# Patient Record
Sex: MALE | Race: BLACK OR AFRICAN AMERICAN | NOT HISPANIC OR LATINO | Employment: FULL TIME | URBAN - METROPOLITAN AREA
[De-identification: names, ages, dates, MRNs, and addresses within clinical notes are randomized per-mention and may not be internally consistent; named-entity substitution may affect disease eponyms.]

---

## 2017-01-03 ENCOUNTER — CLINICAL SUPPORT (OUTPATIENT)
Dept: REHABILITATION | Facility: HOSPITAL | Age: 62
End: 2017-01-03
Attending: INTERNAL MEDICINE
Payer: COMMERCIAL

## 2017-01-03 DIAGNOSIS — G81.94 LEFT HEMIPARESIS: Primary | ICD-10-CM

## 2017-01-03 PROCEDURE — 97530 THERAPEUTIC ACTIVITIES: CPT | Mod: PO

## 2017-01-03 PROCEDURE — 97110 THERAPEUTIC EXERCISES: CPT | Mod: PO

## 2017-01-03 NOTE — PROGRESS NOTES
Patient:  Alfonso Arias  Clinic #:  [id number]   Date of Note: 01/03/2017   Referring Physician:  Grover Ivey MD  Diagnosis:    1. Left hemiparesis       Visit #32  Start Time: 405  End Time: 455  Total Time: 50 one on one  Group Time: 0    Subjective: Pt. Reported he feels fine. He is using L hand as much as he can but did not exercise as much over the holidays. He states pain with initial movements and that he is slower with most tasks.      Pain: 2 out of 10 L shoulder at rest and 5/10 with first movement of the day in L shoulder which eases with use.     Objective:   Patient seen by OT this session. Treatment  consist of the following: Review of AM movements of swinging arm and shoulder rolls to increase movement.   Ergometer resistance level 5  x 5 minutes forward and 5 minutes reverse. PROM to L shoulder, scapular stabilization exercises L shoulder, Sidelying ER, prone ext, row, and HABD x 30 each.Overhead reach and grasp tasks. Pinch and reach tasks with LUE.      Assessment:Pt will continue to benefit from skilled OT intervention. Patient continues to demonstrate limitation  with  decreased flexibility, decreased range of motion, decreased muscle strength, impaired function and decreased work ability. Pt continues with increased endurance. Tolerated added therex well today with statements that if felt good even though arm tired.    Plan:    Outpatient occupational therapy 2  times weekly for 6 more weeks to include: pt ed, hep, therapeutic exercises, therapeutic activity, ADLs, neuromuscular re-education,       LTG GOALS: Time frame: 8 weeks  Pt will be drink from a cup with L hand with no spills or dropping the cup  Pt. Has trouble releasing cup  Pt will be mod I with feeding  Pt. Still cannot use enough pressure to hold food with fork with LUE.   Pt will increase ROM in L arm for increasing non/verbal expression with L UE to WFL  Improving  Pt will score less than 40% on FOTO for CJ carry  Pt. Is  inconsistent in responses.    STG Goals:  Time frame: 4 weeks    Pt will be drink from a cup with L hand with less than 2 spills  MET  I in HEP.  Pt. Does follow HEP.   Pt will make 100% full fist closure for increase function of L hand  Index lags with grasp. Progressing  Pt. Will be aware of assistive devices to help wash back, crush meds and button. Pt. Has adapted with washing back and one hand buttoning.     PLAN:   Patient/caregiver understands and agrees with plan of care.     Outpatient occupational therapy 2  times weekly for 8 more weeks to include: pt ed, hep, therapeutic exercises, therapeutic activity, ADLs, neuromuscular re-education, joint mobilizations, modalities prn, Extend certification as patient is making functional progress with LUE. Extend to 2/10/16.

## 2017-01-09 ENCOUNTER — INITIAL CONSULT (OUTPATIENT)
Dept: OTOLARYNGOLOGY | Facility: CLINIC | Age: 62
End: 2017-01-09
Payer: COMMERCIAL

## 2017-01-09 ENCOUNTER — CLINICAL SUPPORT (OUTPATIENT)
Dept: AUDIOLOGY | Facility: CLINIC | Age: 62
End: 2017-01-09
Payer: COMMERCIAL

## 2017-01-09 VITALS
WEIGHT: 211.88 LBS | HEART RATE: 52 BPM | BODY MASS INDEX: 28.08 KG/M2 | HEIGHT: 73 IN | TEMPERATURE: 97 F | SYSTOLIC BLOOD PRESSURE: 110 MMHG | DIASTOLIC BLOOD PRESSURE: 65 MMHG

## 2017-01-09 DIAGNOSIS — H90.3 SENSORINEURAL HEARING LOSS OF BOTH EARS: Primary | ICD-10-CM

## 2017-01-09 DIAGNOSIS — Z01.10 EVALUATION OF HEARING IMPAIRMENT: ICD-10-CM

## 2017-01-09 DIAGNOSIS — H61.22 IMPACTED CERUMEN, LEFT EAR: Primary | ICD-10-CM

## 2017-01-09 DIAGNOSIS — Z86.73 HISTORY OF CVA (CEREBROVASCULAR ACCIDENT): ICD-10-CM

## 2017-01-09 PROCEDURE — 69210 REMOVE IMPACTED EAR WAX UNI: CPT | Mod: S$GLB,,, | Performed by: OTOLARYNGOLOGY

## 2017-01-09 PROCEDURE — 92567 TYMPANOMETRY: CPT | Mod: 51,S$GLB,, | Performed by: AUDIOLOGIST

## 2017-01-09 PROCEDURE — 99213 OFFICE O/P EST LOW 20 MIN: CPT | Mod: 25,S$GLB,, | Performed by: OTOLARYNGOLOGY

## 2017-01-09 PROCEDURE — 1159F MED LIST DOCD IN RCRD: CPT | Mod: S$GLB,,, | Performed by: OTOLARYNGOLOGY

## 2017-01-09 PROCEDURE — 92557 COMPREHENSIVE HEARING TEST: CPT | Mod: S$GLB,,, | Performed by: AUDIOLOGIST

## 2017-01-09 PROCEDURE — 99999 PR PBB SHADOW E&M-EST. PATIENT-LVL III: CPT | Mod: PBBFAC,,, | Performed by: OTOLARYNGOLOGY

## 2017-01-09 PROCEDURE — 3074F SYST BP LT 130 MM HG: CPT | Mod: S$GLB,,, | Performed by: OTOLARYNGOLOGY

## 2017-01-09 PROCEDURE — 99999 PR PBB SHADOW E&M-EST. PATIENT-LVL I: CPT | Mod: PBBFAC,,,

## 2017-01-09 PROCEDURE — 3078F DIAST BP <80 MM HG: CPT | Mod: S$GLB,,, | Performed by: OTOLARYNGOLOGY

## 2017-01-09 RX ORDER — FERROUS GLUCONATE 324(37.5)
1 TABLET ORAL DAILY
Refills: 5 | COMMUNITY
Start: 2017-01-03 | End: 2017-01-09

## 2017-01-09 NOTE — MR AVS SNAPSHOT
[facility] - Otorhinolaryngology  [address]  [facility] [address]  Phone: [phone]  Fax: [phone]                  Alfonso Arias   2017 2:45 PM   Initial consult    Description:  Male : 1955   Provider:  Isaias Hargrove III, MD   Department:  [facility] - Otorhinolaryngology           Diagnoses this Visit        Comments    Impacted cerumen, left ear    -  Primary     History of CVA (cerebrovascular accident)         Evaluation of hearing impairment                To Do List           Future Appointments        Provider Department Dept Phone    2017 8:15 AM Vilma Lew OT [facility] [phone]    2017 7:30 AM LAB, [facility] - Laboratory [phone]    3/9/2017 3:20 PM MD [facility] [place] - Internal Medicine [phone]      Goals (5 Years of Data)     None      [facility]     [facility] On [facility] Nurse Care Line -  Assistance  Registered nurses in the [facility] provide clinical advisement, health education, appointment booking, and other advisory services.  Call for this free service at [phone].             Medications           Message regarding Medications     Verify the changes and/or additions to your medication regime listed below are the same as discussed with your clinician today.  If any of these changes or additions are incorrect, please notify your healthcare provider.             Verify that the below list of medications is an accurate representation of the medications you are currently taking.  If none reported, the list may be blank. If incorrect, please contact your healthcare provider. Carry this list with you in case of emergency.           Current Medications     amlodipine (NORVASC) 2.5 MG tablet TAKE ONE TABLET BY MOUTH ONE TIME DAILY    aspirin (ECOTRIN) 81 MG EC tablet Take 1 tablet (81 mg total) by mouth once daily.    atorvastatin (LIPITOR) 40 MG tablet TAKE ONE TABLET BY  "MOUTH ONE TIME DAILY     ferrous gluconate (FERGON) 325 MG Tab Take 1 tablet (325 mg total) by mouth daily with breakfast.    metoprolol tartrate (LOPRESSOR) 25 MG tablet Take 1 tablet (25 mg total) by mouth 2 (two) times daily.    pantoprazole (PROTONIX) 40 MG tablet TAKE ONE TABLET BY MOUTH ONE TIME DAILY           Clinical Reference Information           Vital Signs - Last Recorded  Most recent update: 1/9/2017  2:06 PM by Shakir Contreras MA    BP Pulse Temp    110/65 (BP Location: Left arm, Patient Position: Sitting, BP Method: Automatic) (!) 52 97.2 °F (36.2 °C) (Tympanic)    Ht Wt BMI    6' 1" (1.854 m) 96.1 kg (211 lb 13.8 oz) 27.95 kg/m2      Blood Pressure          Most Recent Value    BP  110/65      Allergies as of 1/9/2017     No Known Allergies      Immunizations Administered on Date of Encounter - 1/9/2017     None      Instructions    Cerumen impaction removed from deep AS eac with suction/microforceps  Audiometry reviewed: multi-frequency SNHL  Pt. Is a candidate for hearing amplification for one or both ears   copy of audiogram/KEARA Berry's card/Rx to obtain hearing aid (S) provided  Monitor jhearing yearly; ear cleaning prn            "

## 2017-01-09 NOTE — LETTER
January 9, 2017      Grover Ivey MD  [address]  [place] [address]           [facility] - Otorhinolaryngology  [address]  [facility] [address]  Phone: [phone]  Fax: [phone]          Patient: Alfonso Arias   MR Number: [id number]   YOB: 1955   Date of Visit: 1/9/2017       Dear Dr. Grover Ivey:    Thank you for referring Alfonso Arias to me for evaluation. Attached you will find relevant portions of my assessment and plan of care.    If you have questions, please do not hesitate to call me. I look forward to following Alfonso Arias along with you.    Sincerely,    Isaias Hargrove III, MD    Enclosure  CC:  No Recipients    If you would like to receive this communication electronically, please contact [email or web address] or [phone] to request more information on [facility] access.    For providers and/or their staff who would like to refer a patient to [facility], please contact us through our one-stop-shop provider referral line, [facility], at [phone].    If you feel you have received this communication in error or would no longer like to receive these types of communications, please e-mail [email or web address]

## 2017-01-09 NOTE — PATIENT INSTRUCTIONS
Cerumen impaction removed from deep AS eac with suction/microforceps  Audiometry reviewed: multi-frequency SNHL  Pt. Is a candidate for hearing amplification for one or both ears   copy of audiogram/KEARA Berry's card/Rx to obtain hearing aid (S) provided  Monitor jhearing yearly; ear cleaning prn

## 2017-01-10 NOTE — PROGRESS NOTES
Subjective:       Patient ID: Alfonso Arias is a 61 y.o. male.    Chief Complaint: No chief complaint on file.    HPI: Mr. Arias is a 61-year-old slightly dysarthric -American male with status post a CVA ( cerebral thrombosis with cerebral infarction) which occurred in February 2015 affecting the left side of his body.  Head scanning of 2015 indicated a small infarct of the right posterior insular extending into the centrum semiovale.  He ndicates the need for an ear cleaning procedure in his left ear.  He is here for hearing assessment as well.  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  He try going back to work after his stroke was unable to travel and in doing with her work load; he is now retired.  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  He knew Merlin Olson.    PMH: High blood pressure, high cholesterol, stroke, arthritis, hearing loss, unspecified cancer  PSH:  Family history: Unspecified cancer, hearing loss, arthritis, stroke, mental illness, alcoholism, asthma, high cholesterol, high blood pressure  Review of Systems   Ears: Positive for hearing loss, ear pain, ear pressure, ringing in ear, dizziness, head trauma and family history of hearing loss.    Nose:  Positive for nasal obstruction, nasal or sinus surgery, loss of smell, postnasal drip and snoring.    Mouth/Throat: Positive for pain swallowing, impaired swallowing and hoarse voice.   Constitutional: Positive for recent unexplained weight loss, fever, chills and night sweats.    Eyes:  Positive for visual change.   Cardiovascular:  Positive for history of high blood pressure.   Respiratory:  Positive for recent cough.    Gastrointestinal:  Positive for history of stomach ulcers or pain, acid reflux, indigestion and blood in stool.   Other:  Positive for kidney problem, bladder problem, arthritis, weakness,  confusion, depression, anxiety, heat intolerance, cold intolerance and swollen glands. Negative for rash.        Tthe patient completed an audiometric study performed by the [facility] audiology service after his left ear canal was cleaned.  The results of duplicated below and reviewed with the patient in detail.  Objective:           Blood pressure 110/65 pulse 52 temperature 97.2 height 6 feet 1 inch weight 211 pounds  Gen.: Alert and oriented and but slightly dysarthric gentleman in no acute distress.  Physical Exam   Constitutional: He is oriented to person, place, and time. He appears well-developed and well-nourished.   HENT:   Head: Normocephalic.   Right Ear: Hearing, tympanic membrane and ear canal normal. No drainage. No foreign bodies. No mastoid tenderness. Tympanic membrane is not perforated. No decreased hearing is noted.   Left Ear: Hearing, tympanic membrane and ear canal normal. No drainage. No foreign bodies. No mastoid tenderness. Tympanic membrane is not perforated. No decreased hearing is noted.   Ears:    Nose: No nose lacerations, nasal deformity, septal deviation or nasal septal hematoma. No epistaxis. Right sinus exhibits no maxillary sinus tenderness and no frontal sinus tenderness. Left sinus exhibits no maxillary sinus tenderness and no frontal sinus tenderness.   Mouth/Throat: Uvula is midline, oropharynx is clear and moist and mucous membranes are normal. He does not have dentures. No oral lesions. No trismus in the jaw. No uvula swelling or dental caries. No oropharyngeal exudate or tonsillar abscesses.   Neck: No thyromegaly present.   Pulmonary/Chest: Effort normal. No stridor.   Lymphadenopathy:     He has no cervical adenopathy.   Neurological: He is alert and oriented to person, place, and time. He displays weakness.   Skin: No rash noted.   Psychiatric: His behavior is normal.       Assessment:       1. Impacted cerumen, left ear    2. History of CVA  (cerebrovascular accident)    3. Evaluation of hearing impairment        Plan:     Cerumen impaction removed from deep AS eac with suction/microforceps  Audiometry reviewed: multi-frequency SNHL  Pt. Is a candidate for hearing amplification for one or both ears   copy of audiogram/KEARA Berry's card/Rx to obtain hearing aid (S) provided  Monitor jhearing yearly; ear cleaning prn

## 2017-01-13 ENCOUNTER — CLINICAL SUPPORT (OUTPATIENT)
Dept: REHABILITATION | Facility: HOSPITAL | Age: 62
End: 2017-01-13
Attending: INTERNAL MEDICINE
Payer: COMMERCIAL

## 2017-01-13 DIAGNOSIS — G81.94 LEFT HEMIPARESIS: Primary | ICD-10-CM

## 2017-01-13 PROCEDURE — 97110 THERAPEUTIC EXERCISES: CPT | Mod: PO

## 2017-01-13 PROCEDURE — 97530 THERAPEUTIC ACTIVITIES: CPT | Mod: PO

## 2017-01-13 NOTE — PROGRESS NOTES
Patient:  Alfonso Arias  Clinic #:  [id number]   Date of Note: 01/13/2017   Referring Physician:  Grover Ivey MD  Diagnosis:    1. Left hemiparesis       Visit #33  Start Time: 815  End Time: 905  Total Time: 50 one on one  Group Time: 0    Subjective: Pt. Reported he feels fine. He is using L hand as much as he can stating exercise is his job now.    Pain: 0 out of 10 L shoulder at rest with movement  in L shoulder.     Objective:   Patient seen by OT this session. Treatment  consist of the following:  Ergometer resistance level 5  x 5 minutes forward and 5 minutes reverse. PROM to L shoulder, scapular stabilization exercises L shoulder, Sidelying ER, prone ext, row, and HABD x 30 each Pinch and manipulation tasks with LUE with nuts/bolts and card tasks.      Assessment:Pt will continue to benefit from skilled OT intervention. Patient continues to demonstrate limitation  with  decreased flexibility, decreased range of motion, decreased muscle strength, impaired function and decreased work ability. Pt continues with increased endurance. Tolerated added therex well today with statements that if felt good even though arm tired.    Plan:    Outpatient occupational therapy 2  times weekly for 6 more weeks to include: pt ed, hep, therapeutic exercises, therapeutic activity, ADLs, neuromuscular re-education,       LTG GOALS: Time frame: 8 weeks  Pt will be drink from a cup with L hand with no spills or dropping the cup  Pt. Has trouble releasing cup  Pt will be mod I with feeding  Pt. Still cannot use enough pressure to hold food with fork with LUE.   Pt will increase ROM in L arm for increasing non/verbal expression with L UE to WFL  Improving  Pt will score less than 40% on FOTO for CJ carry  Pt. Is inconsistent in responses.    STG Goals:  Time frame: 4 weeks    Pt will be drink from a cup with L hand with less than 2 spills  MET  I in HEP.  Pt. Does follow HEP.   Pt will make 100% full fist closure for increase  function of L hand  Index lags with grasp. Progressing  Pt. Will be aware of assistive devices to help wash back, crush meds and button. Pt. Has adapted with washing back and one hand buttoning.     PLAN:   Patient/caregiver understands and agrees with plan of care.     Outpatient occupational therapy 2  times weekly for 8 more weeks to include: pt ed, hep, therapeutic exercises, therapeutic activity, ADLs, neuromuscular re-education, joint mobilizations, modalities prn, Extend certification as patient is making functional progress with LUE. Extend to 2/10/16.

## 2017-01-17 ENCOUNTER — TELEPHONE (OUTPATIENT)
Dept: INTERNAL MEDICINE | Facility: CLINIC | Age: 62
End: 2017-01-17

## 2017-01-17 NOTE — TELEPHONE ENCOUNTER
----- Message from Carina Nugent sent at 1/17/2017 11:28 AM CST -----  Contact: [phone] socrates   Calling to get copy of latest notes. Sent request on 1/9 via fax and never heard anything back.

## 2017-01-19 ENCOUNTER — CLINICAL SUPPORT (OUTPATIENT)
Dept: REHABILITATION | Facility: HOSPITAL | Age: 62
End: 2017-01-19
Attending: INTERNAL MEDICINE
Payer: COMMERCIAL

## 2017-01-19 DIAGNOSIS — G81.94 LEFT HEMIPARESIS: Primary | ICD-10-CM

## 2017-01-19 PROCEDURE — 97140 MANUAL THERAPY 1/> REGIONS: CPT | Mod: PO

## 2017-01-19 PROCEDURE — 97110 THERAPEUTIC EXERCISES: CPT | Mod: PO

## 2017-01-19 NOTE — PROGRESS NOTES
Patient:  Alfonso Arias  Clinic #:  [id number]   Date of Note: 01/19/2017   Referring Physician:  Grover Ivey MD  Diagnosis:    1. Left hemiparesis       Visit #34  Start Time: 11  End Time: 1155  Total Time: 50 one on one  Group Time: 0    Subjective: Pt. Reported he feels fine. He is using L hand as much as he can he reports his shoulder is still feeling a little stiff.   Pain: 0 out of 10 L shoulder at rest with movement  in L shoulder.     Objective:   Patient seen by OT this session. Treatment  consist of the following:  Ergometer resistance level 5  x 5 minutes forward and 5 minutes reverse. PROM to L shoulder, scapular stabilization exercises L shoulder, Sidelying ER, prone ext, row, and HABD x 30 each Pinch and manipulation tasks using small velcro blocks x 10 min.       Assessment:Pt will continue to benefit from skilled OT intervention. Improved mobility and laxity following manual techniques today. Pt reported feeling more loose than when he came in.  Patient continues to demonstrate limitation  with  decreased flexibility, decreased range of motion, decreased muscle strength, impaired function and decreased work ability. Pt continues with increased endurance. Tolerated added therex well today with statements that if felt good even though arm tired.    Plan:    Outpatient occupational therapy 2  times weekly for 6 more weeks to include: pt ed, hep, therapeutic exercises, therapeutic activity, ADLs, neuromuscular re-education,       LTG GOALS: Time frame: 8 weeks  Pt will be drink from a cup with L hand with no spills or dropping the cup  Pt. Has trouble releasing cup  Pt will be mod I with feeding  Pt. Still cannot use enough pressure to hold food with fork with LUE.   Pt will increase ROM in L arm for increasing non/verbal expression with L UE to WFL  Improving  Pt will score less than 40% on FOTO for CJ carry  Pt. Is inconsistent in responses.    STG Goals:  Time frame: 4 weeks    Pt will be drink  from a cup with L hand with less than 2 spills  MET  I in HEP.  Pt. Does follow HEP.   Pt will make 100% full fist closure for increase function of L hand  Index lags with grasp. Progressing  Pt. Will be aware of assistive devices to help wash back, crush meds and button. Pt. Has adapted with washing back and one hand buttoning.     PLAN:   Patient/caregiver understands and agrees with plan of care.     Outpatient occupational therapy 2  times weekly for 8 more weeks to include: pt ed, hep, therapeutic exercises, therapeutic activity, ADLs, neuromuscular re-education, joint mobilizations, modalities prn, Extend certification as patient is making functional progress with LUE. Extend to 2/10/16.

## 2017-01-30 ENCOUNTER — TELEPHONE (OUTPATIENT)
Dept: INTERNAL MEDICINE | Facility: CLINIC | Age: 62
End: 2017-01-30

## 2017-01-30 NOTE — TELEPHONE ENCOUNTER
----- Message from Isaías Jin sent at 1/30/2017  2:07 PM CST -----  Contact: Pt at [phone]  Patient would like to get medical advice.  Symptoms (please be specific):  Sore throat, mouth pain  How long has patient had these symptoms:  About 3 days  Pharmacy name and phone #:  [facility]  [phone] (Phone)  [phone] (Fax)  Any drug allergies:  none    Comments: Pt requesting a call back before medication is called in to pharmacy.

## 2017-01-31 ENCOUNTER — OFFICE VISIT (OUTPATIENT)
Dept: INTERNAL MEDICINE | Facility: CLINIC | Age: 62
End: 2017-01-31
Payer: COMMERCIAL

## 2017-01-31 VITALS
HEART RATE: 68 BPM | HEIGHT: 73 IN | BODY MASS INDEX: 27.49 KG/M2 | TEMPERATURE: 98 F | WEIGHT: 207.44 LBS | DIASTOLIC BLOOD PRESSURE: 70 MMHG | SYSTOLIC BLOOD PRESSURE: 110 MMHG

## 2017-01-31 DIAGNOSIS — Z85.810 HISTORY OF TONGUE CANCER: ICD-10-CM

## 2017-01-31 DIAGNOSIS — J02.9 PHARYNGITIS, UNSPECIFIED ETIOLOGY: Primary | ICD-10-CM

## 2017-01-31 PROCEDURE — 99213 OFFICE O/P EST LOW 20 MIN: CPT | Mod: S$GLB,,, | Performed by: FAMILY MEDICINE

## 2017-01-31 PROCEDURE — 99999 PR PBB SHADOW E&M-EST. PATIENT-LVL III: CPT | Mod: PBBFAC,,, | Performed by: FAMILY MEDICINE

## 2017-01-31 PROCEDURE — 1159F MED LIST DOCD IN RCRD: CPT | Mod: S$GLB,,, | Performed by: FAMILY MEDICINE

## 2017-01-31 PROCEDURE — 3078F DIAST BP <80 MM HG: CPT | Mod: S$GLB,,, | Performed by: FAMILY MEDICINE

## 2017-01-31 PROCEDURE — 3074F SYST BP LT 130 MM HG: CPT | Mod: S$GLB,,, | Performed by: FAMILY MEDICINE

## 2017-01-31 RX ORDER — AMOXICILLIN 500 MG/1
500 TABLET, FILM COATED ORAL EVERY 12 HOURS
Qty: 14 TABLET | Refills: 0 | Status: SHIPPED | OUTPATIENT
Start: 2017-01-31 | End: 2017-02-10

## 2017-01-31 RX ORDER — HYDROCODONE BITARTRATE AND ACETAMINOPHEN 5; 325 MG/1; MG/1
1 TABLET ORAL EVERY 6 HOURS PRN
Qty: 30 TABLET | Refills: 0 | Status: SHIPPED | OUTPATIENT
Start: 2017-01-31 | End: 2017-07-21 | Stop reason: SDUPTHER

## 2017-01-31 NOTE — MR AVS SNAPSHOT
[facility] Internal Medicine   [facility] [address]  Phone: [phone]  Fax: [phone]                  Alfonso Arias   2017 9:40 AM   Office Visit    Description:  Male : 1955   Provider:  Charly Stevens MD   Department:  [facility] Internal Medicine           Reason for Visit     Otalgia     Sore Throat     Oral Pain           Diagnoses this Visit        Comments    Pharyngitis, unspecified etiology    -  Primary            To Do List           Future Appointments        Provider Department Dept Phone    2/3/2017 11:00 AM Renate Engle, OT [facility] [phone]    2017 7:30 AM LAB, [facility] - Laboratory [phone]    3/9/2017 3:20 PM Grover Ivey MD [facility] Internal Medicine [phone]      Goals (5 Years of Data)     None       These Medications        Disp Refills Start End    amoxicillin (AMOXIL) 500 MG Tab 14 tablet 0 2017 2/10/2017    Take 1 tablet (500 mg total) by mouth every 12 (twelve) hours. - Oral    Pharmacy: [facility] [id number] [place] [address] Ph #: [phone]       hydrocodone-acetaminophen 5-325mg (NORCO) 5-325 mg per tablet 30 tablet 0 2017     Take 1 tablet by mouth every 6 (six) hours as needed for Pain (severe pain). - Oral    Pharmacy: [facility] [id number] [place] [address] Ph #: [phone]         [facility]     [facility] -  Assistance  Registered nurses in the [facility] provide clinical advisement, health education, appointment booking, and other advisory services.  Call for this free service at [phone].             Medications           Message regarding Medications     Verify the changes and/or additions to your medication regime listed below are the same as discussed with your clinician today.  If any of these changes or additions are incorrect, please notify  "your healthcare provider.        START taking these NEW medications        Refills    amoxicillin (AMOXIL) 500 MG Tab 0    Sig: Take 1 tablet (500 mg total) by mouth every 12 (twelve) hours.    Class: Normal    Route: Oral    hydrocodone-acetaminophen 5-325mg (NORCO) 5-325 mg per tablet 0    Sig: Take 1 tablet by mouth every 6 (six) hours as needed for Pain (severe pain).    Class: Normal    Route: Oral           Verify that the below list of medications is an accurate representation of the medications you are currently taking.  If none reported, the list may be blank. If incorrect, please contact your healthcare provider. Carry this list with you in case of emergency.           Current Medications     amlodipine (NORVASC) 2.5 MG tablet TAKE ONE TABLET BY MOUTH ONE TIME DAILY    amoxicillin (AMOXIL) 500 MG Tab Take 1 tablet (500 mg total) by mouth every 12 (twelve) hours.    aspirin (ECOTRIN) 81 MG EC tablet Take 1 tablet (81 mg total) by mouth once daily.    atorvastatin (LIPITOR) 40 MG tablet TAKE ONE TABLET BY MOUTH ONE TIME DAILY     ferrous gluconate (FERGON) 325 MG Tab Take 1 tablet (325 mg total) by mouth daily with breakfast.    hydrocodone-acetaminophen 5-325mg (NORCO) 5-325 mg per tablet Take 1 tablet by mouth every 6 (six) hours as needed for Pain (severe pain).    metoprolol tartrate (LOPRESSOR) 25 MG tablet Take 1 tablet (25 mg total) by mouth 2 (two) times daily.    pantoprazole (PROTONIX) 40 MG tablet TAKE ONE TABLET BY MOUTH ONE TIME DAILY           Clinical Reference Information           Vital Signs - Last Recorded  Most recent update: 1/31/2017  9:53 AM by Rhina Betancur MA    BP Pulse Temp Ht Wt BMI    110/70 (BP Location: Right arm, Patient Position: Sitting, BP Method: Manual) 68 98.3 °F (36.8 °C) (Oral) 6' 1" (1.854 m) 94.1 kg (207 lb 7.3 oz) 27.37 kg/m2      Blood Pressure          Most Recent Value    BP  110/70      Allergies as of 1/31/2017     No Known Allergies      Immunizations " Administered on Date of Encounter - 1/31/2017     None      Instructions      When You Have a Sore Throat  A sore throat can be painful. There are many reasons why you may have a sore throat. Your healthcare provider will work with you to find the cause of your sore throat. He or she will also find the best treatment for you.      What Causes a Sore Throat?  Sore throats can be caused or worsened by:  · Cold or flu viruses  · Bacteria  · Irritants such as tobacco smoke  · Acid reflux  A Healthy Throat  The tonsils are on the sides of the throat near the base of the tongue. They collect viruses and bacteria and help fight infection. The throat (pharynx) is the passage for air. Mucus from the nasal cavity also moves down the passage.  An Inflamed Throat  The tonsils and pharynx can become inflamed due to a cold or flu virus. Postnasal drip (excess mucus draining from the nasal cavity) can irritate the throat. It can also make the throat or tonsils more likely to be infected by bacteria. Severe, untreated tonsillitis in children or adults can cause a pocket of pus (abscess) to form near the tonsil.  Your Evaluation  A medical evaluation can help find the cause of your sore throat. It can also help your healthcare provider choose the best treatment for you. The evaluation may include a health history, physical exam, and diagnostic tests.  Health History  Your healthcare provider may ask you the following:  · How long has the sore throat lasted and how have you been treating it?  · Do you have any other symptoms, such as body aches, fever, or cough?  · Does your sore throat recur? If so, how often? How many days of school or work have you missed because of a sore throat?  · Do you have trouble eating or swallowing?  · Have you been told that you snore or have other sleep problems?  · Do you have bad breath?  · Do you cough up bad-tasting mucus?  Physical Exam  During the exam, your healthcare provider checks your ears,  nose, and throat for problems. He or she also checks for swelling in the neck, and may listen to your chest.  Possible Tests  Other tests your healthcare provider may perform include:  · A throat swab to check for bacteria such as streptococcus (the bacteria that causes strep throat)  · A blood test to check for mononucleosis (a viral infection)  · A chest x-ray to rule out pneumonia, especially if you have a cough  Treating a Sore Throat  Treatment depends on many factors. What is the likely cause? Is the problem recent? Does it keep coming back? In many cases, the best thing to do is to treat the symptoms, rest, and let the problem heal itself. Antibiotics may help clear up some infections. For cases of severe or recurring tonsillitis, the tonsils may need to be removed.  Relieving Your Symptoms  · Dont smoke, and avoid secondhand smoke.  · For children, try throat sprays or Popsicles. Adults and older children may try lozenges.  · Drink warm liquids to soothe the throat and help thin mucus. Avoid alcohol, spicy foods, and acidic drinks such as orange juice. These can irritate the throat.  · Gargle with warm saltwater (1 teaspoon of salt to 8 ounces of warm water).  · Use a humidifier to keep air moist and relieve throat dryness.  · Try over-the-counter pain relievers such as acetaminophen or ibuprofen. Use as directed, and dont exceed the recommended dose. Dont give aspirin to children.   Are Antibiotics Needed?  If your sore throat is due to a bacterial infection, antibiotics may speed healing and prevent complications. But most sore throats are caused by cold or flu viruses. And antibiotics dont treat viral illness. In fact, using antibiotics when theyre not needed may produce bacteria that are harder to kill. Your healthcare provider will prescribe antibiotics only if he or she thinks they are likely to help.  If Antibiotics Are Prescribed  Take the medication exactly as directed. Be sure to finish your  prescription even if youre feeling better.  And be sure to ask your healthcare provider or pharmacist what side effects are common and what to do about them.  Is Surgery Needed?  In some cases, tonsils need to be removed. This is often done as outpatient (same-day) surgery. Your healthcare provider may advise removing the tonsils in cases of:  · Several severe bouts of tonsillitis in a year. Severe episodes include those that lead to missed days of school or work, or that need to be treated with antibiotics.  · Tonsillitis that causes breathing problems during sleep.  · Tonsillitis caused by food particles collecting in pouches in the tonsils (cryptic tonsillitis).  Call your healthcare provider if any of the following occur:  · Symptoms worsen, or new symptoms develop.  · Swollen tonsils make breathing difficult.  · The pain is severe enough to keep you from drinking liquids.  · A skin rash, hives, or wheezing develops. Any of these could signal an allergic reaction to antibiotics.  · Symptoms dont improve within a week.  · Symptoms dont improve within 2-3 days of starting antibiotics.   © 2992-2504 [employer]. [address] [place] [address]. All rights reserved. This information is not intended as a substitute for professional medical care. Always follow your healthcare professional's instructions.        Self-Care for Sore Throats  Sore throats occur for many reasons, such as colds, allergies, and infections caused by viruses or bacteria. In any case, your throat becomes red and sore. Your goal for self-care is to reduce your discomfort while giving your throat a chance to heal.    Moisten and Soothe Your Throat  · Try a sip of water first thing after waking up.  · Keep your throat moist by drinking 6 or more glasses of clear liquids every day.  · Run a cool-air humidifier in your room overnight.  · Avoid cigarette smoke.   · Suck on throat lozenges, cough drops, hard candy, ice  chips, or frozen fruit-juice bars. Use the sugar-free versions if your diet or medical condition require them.  Gargle to Ease Irritation  Gargling every hour or 2 can ease irritation. Try gargling with 1 of these solutions:  · 1/4 teaspoon of salt in 1/2 cup of warm water  · An over-the-counter anesthetic gargle  Use Medication for More Relief  Over-the-counter medication can reduce sore throat symptoms. Ask your pharmacist if you have questions about which medication to use:  · Ease pain with anesthetic sprays. Aspirin or an aspirin substitute also helps. Remember, never give aspirin to anyone 18 or younger, or if you are already taking blood thinners.   · For sore throats caused by allergies, try antihistamines to block the allergic reaction.  · Remember: unless a sore throat is caused by a bacterial infection, antibiotics wont help you.  Prevent Future Sore Throats  · Stop smoking or reduce contact with secondhand smoke. Smoke irritates the tender throat lining.  · Limit contact with pets and with allergy-causing substances, such as pollen and mold.  · When youre around someone with a sore throat or cold, wash your hands frequently to keep viruses or bacteria from spreading.  · Dont strain your vocal cords.  Call Your Health Care Provider  Contact your doctor if you have:  · A temperature over 101°F (38.3°C)  · White spots on the throat  · Great difficulty swallowing  · Trouble breathing  · A skin rash  · Recent exposure to someone else with strep bacteria  · Severe hoarseness and swollen glands in the neck or jaw   © 2521-0747 [employer]. [address] [place] [address], [place] [address]. All rights reserved. This information is not intended as a substitute for professional medical care. Always follow your healthcare professional's instructions.

## 2017-01-31 NOTE — PROGRESS NOTES
Subjective:   Patient ID: Alfonso Arias is a 62 y.o. male.    Chief Complaint: Otalgia; Sore Throat; and Oral Pain      HPI  Cordial 61 yo male here for mouth pain and RIGHT ear pain and sore throat. Going on for a few days. Some mild rhinorrhea. Some sneezing. No significant cough. No fevers or chills. Nothing makes it better. Swallowing makes pain worse. Significant history of tongue cancer and G-tube placement, etc. No other complaints inc no recent dysphagia or odynophagia other than that chronic except for past two days and aw URI symptoms.  Patient queried and denies any further complaints.      ALLERGIES AND MEDICATIONS: updated and reviewed.  Review of patient's allergies indicates:  No Known Allergies    Current Outpatient Prescriptions:     amlodipine (NORVASC) 2.5 MG tablet, TAKE ONE TABLET BY MOUTH ONE TIME DAILY, Disp: 30 tablet, Rfl: 10    amoxicillin (AMOXIL) 500 MG Tab, Take 1 tablet (500 mg total) by mouth every 12 (twelve) hours., Disp: 14 tablet, Rfl: 0    aspirin (ECOTRIN) 81 MG EC tablet, Take 1 tablet (81 mg total) by mouth once daily., Disp: 30 tablet, Rfl: 0    atorvastatin (LIPITOR) 40 MG tablet, TAKE ONE TABLET BY MOUTH ONE TIME DAILY , Disp: 30 tablet, Rfl: 10    ferrous gluconate (FERGON) 325 MG Tab, Take 1 tablet (325 mg total) by mouth daily with breakfast., Disp: 30 tablet, Rfl: 5    hydrocodone-acetaminophen 5-325mg (NORCO) 5-325 mg per tablet, Take 1 tablet by mouth every 6 (six) hours as needed for Pain (severe pain)., Disp: 30 tablet, Rfl: 0    metoprolol tartrate (LOPRESSOR) 25 MG tablet, Take 1 tablet (25 mg total) by mouth 2 (two) times daily., Disp: 60 tablet, Rfl: 11    pantoprazole (PROTONIX) 40 MG tablet, TAKE ONE TABLET BY MOUTH ONE TIME DAILY, Disp: 30 tablet, Rfl: 4    Review of Systems   Constitutional: Negative for activity change, chills, fatigue, fever and unexpected weight change.   HENT: Positive for ear pain, postnasal drip, rhinorrhea, sinus pressure and  "tinnitus. Negative for facial swelling and nosebleeds.    Eyes: Negative for pain and redness.   Respiratory: Negative for cough and shortness of breath.    Gastrointestinal: Negative for abdominal pain, diarrhea, nausea and vomiting.   Musculoskeletal: Negative for joint swelling and neck pain.   Neurological: Negative for tremors and weakness.       Objective:     Vitals:    01/31/17 0950   BP: 110/70   Pulse: 68   Temp: 98.3 °F (36.8 °C)   TempSrc: Oral   Weight: 94.1 kg (207 lb 7.3 oz)   Height: 6' 1" (1.854 m)   PainSc:   7     Body mass index is 27.37 kg/(m^2).    Physical Exam   Constitutional: He appears well-developed and well-nourished.   HENT:   Head: Normocephalic and atraumatic.   Right Ear: External ear normal.   Left Ear: External ear normal.   Mouth/Throat: Posterior oropharyngeal erythema present. No oropharyngeal exudate or posterior oropharyngeal edema.   Cardiovascular: Normal rate and regular rhythm.    Pulmonary/Chest: Effort normal and breath sounds normal.   Nursing note and vitals reviewed.      Assessment and Plan:   Alfonso was seen today for otalgia, sore throat and oral pain.    Diagnoses and all orders for this visit:    Pharyngitis, unspecified etiology  -     amoxicillin (AMOXIL) 500 MG Tab; Take 1 tablet (500 mg total) by mouth every 12 (twelve) hours.  -     hydrocodone-acetaminophen 5-325mg (NORCO) 5-325 mg per tablet; Take 1 tablet by mouth every 6 (six) hours as needed for Pain (severe pain).    History of tongue cancer.        Return in about 1 week (around 2/7/2017), or if symptoms worsen or fail to improve.    THIS NOTE WILL BE SHARED WITH THE PATIENT.        "

## 2017-01-31 NOTE — PATIENT INSTRUCTIONS
When You Have a Sore Throat  A sore throat can be painful. There are many reasons why you may have a sore throat. Your healthcare provider will work with you to find the cause of your sore throat. He or she will also find the best treatment for you.      What Causes a Sore Throat?  Sore throats can be caused or worsened by:  · Cold or flu viruses  · Bacteria  · Irritants such as tobacco smoke  · Acid reflux  A Healthy Throat  The tonsils are on the sides of the throat near the base of the tongue. They collect viruses and bacteria and help fight infection. The throat (pharynx) is the passage for air. Mucus from the nasal cavity also moves down the passage.  An Inflamed Throat  The tonsils and pharynx can become inflamed due to a cold or flu virus. Postnasal drip (excess mucus draining from the nasal cavity) can irritate the throat. It can also make the throat or tonsils more likely to be infected by bacteria. Severe, untreated tonsillitis in children or adults can cause a pocket of pus (abscess) to form near the tonsil.  Your Evaluation  A medical evaluation can help find the cause of your sore throat. It can also help your healthcare provider choose the best treatment for you. The evaluation may include a health history, physical exam, and diagnostic tests.  Health History  Your healthcare provider may ask you the following:  · How long has the sore throat lasted and how have you been treating it?  · Do you have any other symptoms, such as body aches, fever, or cough?  · Does your sore throat recur? If so, how often? How many days of school or work have you missed because of a sore throat?  · Do you have trouble eating or swallowing?  · Have you been told that you snore or have other sleep problems?  · Do you have bad breath?  · Do you cough up bad-tasting mucus?  Physical Exam  During the exam, your healthcare provider checks your ears, nose, and throat for problems. He or she also checks for swelling in the  neck, and may listen to your chest.  Possible Tests  Other tests your healthcare provider may perform include:  · A throat swab to check for bacteria such as streptococcus (the bacteria that causes strep throat)  · A blood test to check for mononucleosis (a viral infection)  · A chest x-ray to rule out pneumonia, especially if you have a cough  Treating a Sore Throat  Treatment depends on many factors. What is the likely cause? Is the problem recent? Does it keep coming back? In many cases, the best thing to do is to treat the symptoms, rest, and let the problem heal itself. Antibiotics may help clear up some infections. For cases of severe or recurring tonsillitis, the tonsils may need to be removed.  Relieving Your Symptoms  · Dont smoke, and avoid secondhand smoke.  · For children, try throat sprays or Popsicles. Adults and older children may try lozenges.  · Drink warm liquids to soothe the throat and help thin mucus. Avoid alcohol, spicy foods, and acidic drinks such as orange juice. These can irritate the throat.  · Gargle with warm saltwater (1 teaspoon of salt to 8 ounces of warm water).  · Use a humidifier to keep air moist and relieve throat dryness.  · Try over-the-counter pain relievers such as acetaminophen or ibuprofen. Use as directed, and dont exceed the recommended dose. Dont give aspirin to children.   Are Antibiotics Needed?  If your sore throat is due to a bacterial infection, antibiotics may speed healing and prevent complications. But most sore throats are caused by cold or flu viruses. And antibiotics dont treat viral illness. In fact, using antibiotics when theyre not needed may produce bacteria that are harder to kill. Your healthcare provider will prescribe antibiotics only if he or she thinks they are likely to help.  If Antibiotics Are Prescribed  Take the medication exactly as directed. Be sure to finish your prescription even if youre feeling better.  And be sure to ask your  healthcare provider or pharmacist what side effects are common and what to do about them.  Is Surgery Needed?  In some cases, tonsils need to be removed. This is often done as outpatient (same-day) surgery. Your healthcare provider may advise removing the tonsils in cases of:  · Several severe bouts of tonsillitis in a year. Severe episodes include those that lead to missed days of school or work, or that need to be treated with antibiotics.  · Tonsillitis that causes breathing problems during sleep.  · Tonsillitis caused by food particles collecting in pouches in the tonsils (cryptic tonsillitis).  Call your healthcare provider if any of the following occur:  · Symptoms worsen, or new symptoms develop.  · Swollen tonsils make breathing difficult.  · The pain is severe enough to keep you from drinking liquids.  · A skin rash, hives, or wheezing develops. Any of these could signal an allergic reaction to antibiotics.  · Symptoms dont improve within a week.  · Symptoms dont improve within 2-3 days of starting antibiotics.   © 2061-1023 [employer]. [address] [place] [address]. All rights reserved. This information is not intended as a substitute for professional medical care. Always follow your healthcare professional's instructions.        Self-Care for Sore Throats  Sore throats occur for many reasons, such as colds, allergies, and infections caused by viruses or bacteria. In any case, your throat becomes red and sore. Your goal for self-care is to reduce your discomfort while giving your throat a chance to heal.    Moisten and Soothe Your Throat  · Try a sip of water first thing after waking up.  · Keep your throat moist by drinking 6 or more glasses of clear liquids every day.  · Run a cool-air humidifier in your room overnight.  · Avoid cigarette smoke.   · Suck on throat lozenges, cough drops, hard candy, ice chips, or frozen fruit-juice bars. Use the sugar-free versions if your  diet or medical condition require them.  Gargle to Ease Irritation  Gargling every hour or 2 can ease irritation. Try gargling with 1 of these solutions:  · 1/4 teaspoon of salt in 1/2 cup of warm water  · An over-the-counter anesthetic gargle  Use Medication for More Relief  Over-the-counter medication can reduce sore throat symptoms. Ask your pharmacist if you have questions about which medication to use:  · Ease pain with anesthetic sprays. Aspirin or an aspirin substitute also helps. Remember, never give aspirin to anyone 18 or younger, or if you are already taking blood thinners.   · For sore throats caused by allergies, try antihistamines to block the allergic reaction.  · Remember: unless a sore throat is caused by a bacterial infection, antibiotics wont help you.  Prevent Future Sore Throats  · Stop smoking or reduce contact with secondhand smoke. Smoke irritates the tender throat lining.  · Limit contact with pets and with allergy-causing substances, such as pollen and mold.  · When youre around someone with a sore throat or cold, wash your hands frequently to keep viruses or bacteria from spreading.  · Dont strain your vocal cords.  Call Your Health Care Provider  Contact your doctor if you have:  · A temperature over 101°F (38.3°C)  · White spots on the throat  · Great difficulty swallowing  · Trouble breathing  · A skin rash  · Recent exposure to someone else with strep bacteria  · Severe hoarseness and swollen glands in the neck or jaw   © 9748-3495 [employer]. [address] [place] [address], [place] [address]. All rights reserved. This information is not intended as a substitute for professional medical care. Always follow your healthcare professional's instructions.

## 2017-02-03 ENCOUNTER — CLINICAL SUPPORT (OUTPATIENT)
Dept: REHABILITATION | Facility: HOSPITAL | Age: 62
End: 2017-02-03
Attending: INTERNAL MEDICINE
Payer: COMMERCIAL

## 2017-02-03 DIAGNOSIS — G81.94 LEFT HEMIPARESIS: Primary | ICD-10-CM

## 2017-02-03 PROCEDURE — 97110 THERAPEUTIC EXERCISES: CPT | Mod: PO

## 2017-02-03 PROCEDURE — 97140 MANUAL THERAPY 1/> REGIONS: CPT | Mod: PO

## 2017-02-03 NOTE — PROGRESS NOTES
Patient:  Alfonso Arias  Clinic #:  [id number]   Date of Note: 02/03/2017   Referring Physician:  Grover Ivey MD  Diagnosis:    1. Left hemiparesis       Visit #34  Start Time: 11  End Time: 1155  Total Time: 50 one on one  Group Time: 0    Subjective: Pt. Reported he feels fine. He has been going to the gym. He thinks he is ready to be discharged.   Pain: 0 out of 10 L shoulder at rest with movement  in L shoulder.     Objective:   Patient seen by OT this session. Treatment  consist of the following:  Ergometer resistance level 5  x 5 minutes forward and 5 minutes reverse. PROM to L shoulder, scapular stabilization exercises L shoulder, Sidelying ER, prone ext, row, and HABD x 30 each Pinch and manipulation tasks using small velcro blocks x 10 min.       Assessment:   Pt has made excellent progress. He has functional shoulder ROM as well as ability to make composite fist. He reports compliance with HEP and there is notable carryover suggestive that he is participating in HEP each day. At this time he is appropriate and agreeable to discharge from skilled therapy.       LTG GOALS: Time frame: 8 weeks  Pt will be drink from a cup with L hand with no spills or dropping the cup  Pt. Has trouble releasing cup  Pt will be mod I with feeding  Pt. Still cannot use enough pressure to hold food with fork with LUE.   Pt will increase ROM in L arm for increasing non/verbal expression with L UE to WFL  Improving  Pt will score less than 40% on FOTO for CJ carry  Pt. Is inconsistent in responses.    STG Goals:  Time frame: 4 weeks    Pt will be drink from a cup with L hand with less than 2 spills  MET  I in HEP.  Pt. Does follow HEP.   Pt will make 100% full fist closure for increase function of L hand  Index lags with grasp. Progressing  Pt. Will be aware of assistive devices to help wash back, crush meds and button. Pt. Has adapted with washing back and one hand buttoning.     PLAN:   Patient/caregiver understands and  agrees with plan of care. Pt discharged from skilled care.

## 2017-02-16 ENCOUNTER — OFFICE VISIT (OUTPATIENT)
Dept: OTOLARYNGOLOGY | Facility: CLINIC | Age: 62
End: 2017-02-16
Payer: COMMERCIAL

## 2017-02-16 VITALS
TEMPERATURE: 97 F | BODY MASS INDEX: 27.37 KG/M2 | DIASTOLIC BLOOD PRESSURE: 70 MMHG | HEART RATE: 59 BPM | SYSTOLIC BLOOD PRESSURE: 129 MMHG | WEIGHT: 207.44 LBS

## 2017-02-16 DIAGNOSIS — C01 SQUAMOUS CELL CARCINOMA OF BASE OF TONGUE: ICD-10-CM

## 2017-02-16 DIAGNOSIS — R13.10 DYSPHAGIA, UNSPECIFIED TYPE: Primary | ICD-10-CM

## 2017-02-16 PROCEDURE — 31575 DIAGNOSTIC LARYNGOSCOPY: CPT | Mod: S$GLB,,, | Performed by: OTOLARYNGOLOGY

## 2017-02-16 PROCEDURE — 3074F SYST BP LT 130 MM HG: CPT | Mod: S$GLB,,, | Performed by: OTOLARYNGOLOGY

## 2017-02-16 PROCEDURE — 99999 PR PBB SHADOW E&M-EST. PATIENT-LVL III: CPT | Mod: PBBFAC,,, | Performed by: OTOLARYNGOLOGY

## 2017-02-16 PROCEDURE — 99213 OFFICE O/P EST LOW 20 MIN: CPT | Mod: 25,S$GLB,, | Performed by: OTOLARYNGOLOGY

## 2017-02-16 PROCEDURE — 3078F DIAST BP <80 MM HG: CPT | Mod: S$GLB,,, | Performed by: OTOLARYNGOLOGY

## 2017-02-16 RX ORDER — FERROUS GLUCONATE 324(37.5)
1 TABLET ORAL DAILY
Refills: 5 | COMMUNITY
Start: 2017-01-31 | End: 2017-05-30 | Stop reason: SDUPTHER

## 2017-02-17 NOTE — PROGRESS NOTES
Chief complaint: Dysphagia    Treatment History  ~2007: Completion of CRT for SCCA L BOT.  ([facility], [place], [place])    HPI   62 y.o. male presents with the above treatment history.  He presents today for evaluation of dysphagia.  He states that he has undergone esophageal dilation in the past and feels that this intervention may improve his swallowing.  He has had dysphagia since undergoing CRT. This dysphagia has been complicated by his stroke.     Review of Systems   Constitutional: Negative for fatigue and unexpected weight change.   HENT: Per HPI.  Eyes: Negative for visual disturbance.   Respiratory: Negative for shortness of breath, hemoptysis   Cardiovascular: Negative for chest pain and palpitations.   Musculoskeletal: Negative for decreased ROM, back pain.   Skin: Negative for rash, sunburn, itching.   Neurological: Negative for dizziness and seizures.   Hematological: Negative for adenopathy. Does not bruise/bleed easily.   Endocrine: Negative for rapid weight loss/weight gain, heat/cold intolerance.     Past Medical History   Past Medical History   Diagnosis Date    Anemia     Carcinoma of tongue 2007     S/p chemotherapy and radiation therapy; treated in [place], [place]    Cerebral infarct      Bilateral small remote infarcts and punctate microhemorrhage noted on 03/1-2/2105. Region of low density in the anterior division of left internal capsule most compatible with a prior infarct.Smaller hypodense focus within the right lentiform nucleus possibly representing an age indeterminate infarct.    Chronic rhinitis     CKD (chronic kidney disease) 06/19/2014     Per internal medicine clinic visit note and an ED note    Dysarthria 03/01/2015    Fungal infection of nail 07/2013    Gastric ulcer 03/2015     Ever ulcer; cauterized during EGD    HH (hiatus hernia) 03/05/2015     Noted during EGD    History of blood transfusion 03/2015     For UGI bleed    History of esophageal stricture       After radiation therapy    Hypertension      formerly treated with medication    Left-sided muscle weakness 03/01/2015     After CVA    Renal cyst      Small right renal cyst    Saliva decreased      After radiation therapy    Schatzki's ring 03/05/2015     Widely patent Schatzki ring    Stroke 03/01/2015     Cerebral thrombosis with cerebral infarct. Posterior right insula extending into the centrum semi-ovale with mild surrounding edema.         Past Surgical History   Past Surgical History   Procedure Laterality Date    Sinus surgery      Tonsillectomy      Tongue biopsies  2007    Esophageal dilatations       post radiation therapy    Colon polypectomies      Esophagogastroduodenoscopy  03//05/2015     For UGI bleed (gastric ulcer)    Gastrostomy tube placement  03/10/2015     Placed via interventional radiology; fluoroscopic guided placement of an 18-Italian [employer] Entuit gastrostomy tube         Family History   Family History   Problem Relation Age of Onset    Cancer Mother 65     uterine    Diabetes Father     Cancer Sister 56     colon    Cancer Brother 60     colon    Heart disease Brother     Depression Brother     Hypertension Brother     Melanoma Neg Hx     Psoriasis Neg Hx     Lupus Neg Hx            Social History   .  Social History     Social History    Marital status:      Spouse name: N/A    Number of children: N/A    Years of education: N/A     Occupational History    contractor/mgr [employer]     Social History Main Topics    Smoking status: Former Smoker    Smokeless tobacco: Never Used    Alcohol use No    Drug use: No    Sexual activity: Not on file     Other Topics Concern    Not on file     Social History Narrative         Allergies   Review of patient's allergies indicates:  No Known Allergies        Physical Exam     Vitals:    02/16/17 1548   BP: 129/70   Pulse: (!) 59   Temp: 96.9 °F (36.1 °C)         Body mass index is 27.37 kg/(m^2).    General: AOx3,  NAD    Right Ear: External Auditory Canal WNL,TM w/o masses/lesions/perforations    Left Ear: External Auditory Canal WNL,TM w/o masses/lesions/perforations    Nose: No gross nasal septal deviation. Inferior Turbinates WNL bilaterally. No septal perforation. No masses/lesions.    Oral Cavity: Oral Tongue mobile, no lesions.  Fasciculations noted.  Hard Palate WNL. No buccal or FOM lesions.  Oropharynx: No masses/lesions noted. Tonsillar fossa without lesions. Soft palate without masses. Midline uvula.    Neck: No LAD I-VI. Status post CRT. Well-healed left neck scar.    Face: House Brackmann I bilaterally.      Flex Naso Elsie Hypo Procedures #2     Procedure: Diagnostic flexible nasopharyngoscopy, laryngoscopy and hypopharyngoscopy:     Routine preparation with local atomizer with 1% neosynephrine/pontocaine with customary flexible endoscope.     Nasopharynx: No lesions.  Mucosa: No lesions.  Adenoids: Present.  Posterior Choanae: Patent.  Eustachian Tubes: Patent.  Posterior pharynx: No lesions.  Larynx/hypopharynx:  Epiglottis: No lesions, without edema.  AE Folds: No lesions.  Vocal cords: No polyps, nodules, ulcers or lesions.  Mobility: Equal and normal bilateral.  Hypopharynx: No lesions.  Piriform sinus: No pooling, no lesions.  Post Cricoid: No erythema, no edema.     Additional Findings: Radiation change throughout pharynx/larynx  .       Assessment   1. Dysphagia, unspecified type    2. Squamous cell carcinoma of base of tongue        Plan   62 y.o. male status post CRT for SCCA L BOT.  ANGUS.  I ordered an MBSS and an esophagram to assess his dysphagia in greater detail before offering him an invasive procedure.  I will contact him with the results of these studies.

## 2017-02-20 ENCOUNTER — TELEPHONE (OUTPATIENT)
Dept: SPEECH THERAPY | Facility: HOSPITAL | Age: 62
End: 2017-02-20

## 2017-02-24 ENCOUNTER — LAB VISIT (OUTPATIENT)
Dept: LAB | Facility: HOSPITAL | Age: 62
End: 2017-02-24
Attending: INTERNAL MEDICINE
Payer: COMMERCIAL

## 2017-02-24 DIAGNOSIS — E78.5 DYSLIPIDEMIA: Chronic | ICD-10-CM

## 2017-02-24 DIAGNOSIS — I10 ESSENTIAL HYPERTENSION: Chronic | ICD-10-CM

## 2017-02-24 LAB
ALBUMIN SERPL BCP-MCNC: 3.4 G/DL
ALP SERPL-CCNC: 76 U/L
ALT SERPL W/O P-5'-P-CCNC: 10 U/L
ANION GAP SERPL CALC-SCNC: 6 MMOL/L
AST SERPL-CCNC: 16 U/L
BASOPHILS # BLD AUTO: 0.03 K/UL
BASOPHILS NFR BLD: 0.5 %
BILIRUB SERPL-MCNC: 0.3 MG/DL
BUN SERPL-MCNC: 18 MG/DL
CALCIUM SERPL-MCNC: 9.7 MG/DL
CHLORIDE SERPL-SCNC: 106 MMOL/L
CO2 SERPL-SCNC: 27 MMOL/L
CREAT SERPL-MCNC: 1.5 MG/DL
DIFFERENTIAL METHOD: ABNORMAL
EOSINOPHIL # BLD AUTO: 0.3 K/UL
EOSINOPHIL NFR BLD: 5.2 %
ERYTHROCYTE [DISTWIDTH] IN BLOOD BY AUTOMATED COUNT: 13.9 %
EST. GFR  (AFRICAN AMERICAN): 56.1 ML/MIN/1.73 M^2
EST. GFR  (NON AFRICAN AMERICAN): 48.5 ML/MIN/1.73 M^2
GLUCOSE SERPL-MCNC: 98 MG/DL
HCT VFR BLD AUTO: 38.1 %
HGB BLD-MCNC: 12.3 G/DL
LYMPHOCYTES # BLD AUTO: 1.5 K/UL
LYMPHOCYTES NFR BLD: 23.2 %
MCH RBC QN AUTO: 27.2 PG
MCHC RBC AUTO-ENTMCNC: 32.3 %
MCV RBC AUTO: 84 FL
MONOCYTES # BLD AUTO: 0.5 K/UL
MONOCYTES NFR BLD: 8.1 %
NEUTROPHILS # BLD AUTO: 4 K/UL
NEUTROPHILS NFR BLD: 62.8 %
PLATELET # BLD AUTO: 287 K/UL
PMV BLD AUTO: 10.7 FL
POTASSIUM SERPL-SCNC: 4.6 MMOL/L
PROT SERPL-MCNC: 8.2 G/DL
RBC # BLD AUTO: 4.53 M/UL
SODIUM SERPL-SCNC: 139 MMOL/L
WBC # BLD AUTO: 6.39 K/UL

## 2017-02-24 PROCEDURE — 36415 COLL VENOUS BLD VENIPUNCTURE: CPT | Mod: PO

## 2017-02-24 PROCEDURE — 85025 COMPLETE CBC W/AUTO DIFF WBC: CPT

## 2017-02-24 PROCEDURE — 80053 COMPREHEN METABOLIC PANEL: CPT

## 2017-02-27 RX ORDER — FERROUS GLUCONATE 324(37.5)
TABLET ORAL
Qty: 30 TABLET | Refills: 5 | Status: SHIPPED | OUTPATIENT
Start: 2017-02-27 | End: 2017-11-10 | Stop reason: SDUPTHER

## 2017-03-01 RX ORDER — PANTOPRAZOLE SODIUM 40 MG/1
TABLET, DELAYED RELEASE ORAL
Qty: 30 TABLET | Refills: 4 | Status: SHIPPED | OUTPATIENT
Start: 2017-03-01 | End: 2017-05-30 | Stop reason: SDUPTHER

## 2017-03-08 ENCOUNTER — HOSPITAL ENCOUNTER (OUTPATIENT)
Dept: RADIOLOGY | Facility: HOSPITAL | Age: 62
Discharge: HOME | End: 2017-03-08
Attending: OTOLARYNGOLOGY
Payer: COMMERCIAL

## 2017-03-08 ENCOUNTER — CLINICAL SUPPORT (OUTPATIENT)
Dept: SPEECH THERAPY | Facility: HOSPITAL | Age: 62
End: 2017-03-08
Attending: OTOLARYNGOLOGY
Payer: COMMERCIAL

## 2017-03-08 DIAGNOSIS — R13.12 DYSPHAGIA, OROPHARYNGEAL: Primary | ICD-10-CM

## 2017-03-08 DIAGNOSIS — R47.1 DYSARTHRIA: ICD-10-CM

## 2017-03-08 DIAGNOSIS — R13.10 DYSPHAGIA, UNSPECIFIED TYPE: ICD-10-CM

## 2017-03-08 DIAGNOSIS — Z86.73 HISTORY OF CVA (CEREBROVASCULAR ACCIDENT): ICD-10-CM

## 2017-03-08 DIAGNOSIS — Z92.3 HISTORY OF HEAD AND NECK RADIATION: ICD-10-CM

## 2017-03-08 DIAGNOSIS — C01 CANCER OF BASE OF TONGUE: ICD-10-CM

## 2017-03-08 PROCEDURE — 74230 X-RAY XM SWLNG FUNCJ C+: CPT | Mod: 26,,, | Performed by: RADIOLOGY

## 2017-03-08 PROCEDURE — 92611 MOTION FLUOROSCOPY/SWALLOW: CPT | Mod: GN | Performed by: SPEECH-LANGUAGE PATHOLOGIST

## 2017-03-08 PROCEDURE — G8998 SWALLOW D/C STATUS: HCPCS | Mod: GN,CK | Performed by: SPEECH-LANGUAGE PATHOLOGIST

## 2017-03-08 PROCEDURE — 74230 X-RAY XM SWLNG FUNCJ C+: CPT | Mod: TC

## 2017-03-08 PROCEDURE — G8997 SWALLOW GOAL STATUS: HCPCS | Mod: GN,CK | Performed by: SPEECH-LANGUAGE PATHOLOGIST

## 2017-03-08 PROCEDURE — G8996 SWALLOW CURRENT STATUS: HCPCS | Mod: GN,CK | Performed by: SPEECH-LANGUAGE PATHOLOGIST

## 2017-03-08 NOTE — MR AVS SNAPSHOT
[facility]  [address]  [facility] [address]  Phone: [phone]                  Alfonso Arias   3/8/2017 2:30 PM   Clinical Support    Description:  Male : 1955   Provider:  Jennifer Aj MA, CCC-SLP   Department:  [facility]           Reason for Visit     Re-evaluation           Diagnoses this Visit        Comments    Dysphagia, oropharyngeal    -  Primary     Dysarthria         History of head and neck radiation         History of CVA (cerebrovascular accident)         Cancer of base of tongue         Dysphagia, unspecified type                To Do List           Future Appointments        Provider Department Dept Phone    2017 7:30 AM LAB, [facility] [place] - Laboratory [phone]    2017 2:40 PM MD [facility] [place] - Internal Medicine [phone]      Goals (5 Years of Data)     None      Follow-Up and Disposition     Return in about 1 week (around 3/15/2017).      [facility]     [facility] -  Assistance  Registered nurses in the [facility] provide clinical advisement, health education, appointment booking, and other advisory services.  Call for this free service at [phone].             Medications           Message regarding Medications     Verify the changes and/or additions to your medication regime listed below are the same as discussed with your clinician today.  If any of these changes or additions are incorrect, please notify your healthcare provider.             Verify that the below list of medications is an accurate representation of the medications you are currently taking.  If none reported, the list may be blank. If incorrect, please contact your healthcare provider. Carry this list with you in case of emergency.           Current Medications     amlodipine (NORVASC) 2.5 MG tablet TAKE ONE TABLET BY MOUTH ONE TIME DAILY    atorvastatin (LIPITOR) 40 MG tablet  TAKE ONE TABLET BY MOUTH ONE TIME DAILY     chlorhexidine (PERIDEX) 0.12 % solution RINSE MOUTH WITH ONE CAPFUL FOR 20 SECONDS TWICE DAILY    ferrous gluconate (FERGON) 325 MG Tab Take 1 tablet (325 mg total) by mouth daily with breakfast.    ferrous gluconate 324 mg (37.5 mg iron) Tab Take 1 tablet by mouth once daily.    ferrous gluconate 324 mg (37.5 mg iron) Tab TAKE 1 TABLET BY MOUTH DAILY WITH BREAKFAST    metoprolol tartrate (LOPRESSOR) 25 MG tablet Take 1 tablet (25 mg total) by mouth 2 (two) times daily.    pantoprazole (PROTONIX) 40 MG tablet TAKE ONE TABLET BY MOUTH ONE TIME DAILY    aspirin (ECOTRIN) 81 MG EC tablet Take 1 tablet (81 mg total) by mouth once daily.    hydrocodone-acetaminophen 5-325mg (NORCO) 5-325 mg per tablet Take 1 tablet by mouth every 6 (six) hours as needed for Pain (severe pain).           Clinical Reference Information           Allergies as of 3/8/2017     No Known Allergies      Immunizations Administered on Date of Encounter - 3/8/2017     None      Orders Placed During Today's Visit      Normal Orders This Visit    SLP video swallow       Instructions    RECOMMENDATIONS/PLAN OF CARE:   It is felt that Mr. Arias would benefit from  1.  Continuation of his current soft food diet with thin liquids for now.  The rationale for this is that he has not yet exhibited an aspiration-related illness.  It is noted that this may be short-lived.  He should continue using the following strategies and common aspiration precautions, including, but not limited to   A.  Appropriate seating for all eating and drinking.   B.  Neutral head position for small sips of liquids (5-ml) as he actually had the best air way protection in this posture.   C.  Continue head turn to L for pureed and soft foods in small bite sizes (1/2 teaspoon).     D.  Continue capsules in water or in puree and tablet in puree.   E.  Monitoring for any signs/symptoms of aspiration (such as wet/gurgly voice that does not  clear with coughing, inability to make any voice sounds, any persistent coughing with oral intake, otherwise unexplained fever, unexplained increased or new difficulty or discomfort breathing, unexplained increase in sleepiness/lethargy/significant fatigue, unexplained increase or new onset confusion or change in cognitive functioning, or any other unexplained change in health or well-being that could be related to swallowing).  2.  Return to my office for 1-2 visits for resumption/initiation of swallowing exercises plus review of strategies/products that may help xerostomia.  3.  Continued f/u with Dr. Gore as directed.       Language Assistance Services     ATTENTION: Language assistance services are available, free of charge. Please call [phone].      ATENCIÓN: Si kasiela ulices, tiene a spencer disposición servicios gratuitos de asistencia lingüística. Llame al [phone].     [facility] Ý: N?u b?n nói Ti?ng Vi?t, có các d?ch v? h? tr? ngôn ng? mi?n phí dành cho b?n. G?i s? [phone].         [facility] complies with applicable Federal civil rights laws and does not discriminate on the basis of race, color, national origin, age, disability, or sex.

## 2017-03-08 NOTE — PROGRESS NOTES
MODIFIED BARIUM SWALLOW STUDY    REASON FOR REFERRAL:  Mr. Alfonso Arias, age 62 y.o., was referred by Dr. James Gore, head and neck surgical oncologist, for a Modified Barium Swallow Study to rule out aspiration, re-assess his overall swallowing function, and determine safest consistencies for oral intake.  He was unaccompanied.    Mr. Arias is a new patient to me.  He has a history of SCCA L BOT with completion of  CRT in ~2007: Completion of CRT ([place], [place]).  Per Dr. Gore's note of 2/16/17, Mr. Arias has had dysphagia since his treatment.  He reported improved swallow functioning after esophageal dilation in the past and saw Dr. Gore to determine if this or other treatment may be beneficial at present.  Per his nasendoscopic exam, there are radiation changes throughout the pharyngolarynx.    His dysphagia was complicated by a known stroke event in early 2015 (R subcortical), but imaging indicates that there have likely been multiple ischemic CVAs.     MEDICAL HISTORY:  Past Medical History:   Diagnosis Date    Anemia     Carcinoma of tongue 2007    S/p chemotherapy and radiation therapy; treated in [place], [place]    Cerebral infarct     Bilateral small remote infarcts and punctate microhemorrhage noted on 03/1-2/2105. Region of low density in the anterior division of left internal capsule most compatible with a prior infarct.Smaller hypodense focus within the right lentiform nucleus possibly representing an age indeterminate infarct.    Chronic rhinitis     CKD (chronic kidney disease) 06/19/2014    Per internal medicine clinic visit note and an ED note    Dysarthria 03/01/2015    Fungal infection of nail 07/2013    Gastric ulcer 03/2015    Ever ulcer; cauterized during EGD    HH (hiatus hernia) 03/05/2015    Noted during EGD    History of blood transfusion 03/2015    For UGI bleed    History of esophageal stricture     After radiation therapy    Hypertension      "formerly treated with medication    Left-sided muscle weakness 03/01/2015    After CVA    Renal cyst     Small right renal cyst    Saliva decreased     After radiation therapy    Schatzki's ring 03/05/2015    Widely patent Schatzki ring    Stroke 03/01/2015    Cerebral thrombosis with cerebral infarct. Posterior right insula extending into the centrum semi-ovale with mild surrounding edema.        SURGICAL HISTORY:  Past Surgical History:   Procedure Laterality Date    colon polypectomies      esophageal dilatations      post radiation therapy    ESOPHAGOGASTRODUODENOSCOPY  03//05/2015    For UGI bleed (gastric ulcer)    GASTROSTOMY TUBE PLACEMENT  03/10/2015    Placed via interventional radiology; fluoroscopic guided placement of an [facility] gastrostomy tube    SINUS SURGERY      tongue biopsies  2007    TONSILLECTOMY       SWALLOWING HISTORY:  Mr. Arias reported that he primarily eats soft, moist foods and turns his head to the L during drinking and eating to help protect his airway.  He uses small bites and sips.  He cuts pills in half and embeds them in applesauce; he takes capsules with water.  He has xerostomia and is missing some molars; he also reported that one of his teeth is "rotten" and an extraction is pending.  This makes chewing more challenging.  If he tries a dry or dense food, he has to chew excessively and drink lots of liquid to swallow it and still may have globus sensation in his throat.  His PEG has been out since 2015.  He reported weight loss of about 16 lbs over the past year.  He denied pna.  His most recent CXR was 3/8/15 and was clear.    Mr. Arias had therapy for dysphagia and dysarthria 3/23 -5/13/15 and returned to therapy 8/31-12/27/16 for worsening dysarthria and memory changes; there were no new swallowing issues in the latter course of therapy.    FAMILY HISTORY:  Family History   Problem Relation Age of Onset    Cancer Mother 65     uterine    " Diabetes Father     Cancer Sister 56     colon    Cancer Brother 60     colon    Heart disease Brother     Depression Brother     Hypertension Brother     Melanoma Neg Hx     Psoriasis Neg Hx     Lupus Neg Hx         SOCIAL HISTORY:  Mr. Bradley lives with his wife in [place].  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    BEHAVIOR:  Mr. Arias was a very pleasant man who had normal affect and social interaction.  He was able to fully cooperate during the study.  Results of today's assessment were considered indicative of his current levels of swallowing functioning.      HEARING:  Subjectively, within normal limits.     ORAL PERIPHERAL:   Informal examination of the oral mechanism revealed structures and functioning within normal limits for swallowing and speech purposes.    Voice quality was not wet before, during, or after the study.    TEST FINDINGS:   Mr. Arias was seen in Radiology with the Radiologist for a Modified Barium Swallow Study.  He was seated on a stool for a left lateral videofluoroscopic view.      Consistencies assessed using radiopaque barium contrast:  thin (3- and 5-ml boluses via spoon and single and continuous swallows via open cup), nectar  (5-ml boluses via spoon and single and continuous swallows via open cup),   thin puree (1/2 teaspoon) via spoon,   thick puree (1/2 teaspoon) via spoon, and   16 Fr barium capsule with water.  NOTE:  Cracker was deferred based on results with above.    Strategies:  Head turn to L:  Beneficial for pureed food and capsule, but slightly worse air way protection for liquids.  Supraglottic swallow: Not beneficial    Phases:  Oral:  Mr. Arias was able to obtain liquid and strip utensils well with no loss of material from the oral cavity.  He moved boluses through the oral cavity with appropriate transit time for liquids, but slowed with pureed foods.  There was no pooling of liquids in the mouth.  Swallow reflex was triggered within normal  limits.    Pharyngeal:  The 3-ml bolus of thin liquid silently penetrated the laryngeal vestibule.  A portion of the 5-ml bolus was SILENTLY aspirated regardless of strategy (no strategy, head turn to L, supraglottic swallow).  Similar results with 5-ml of nectar with trial of chin tuck and head turn to L.  Thin and thick pureed boluses were swallowed with a head turn to L and moved through the pharyngeal phase with no laryngeal penetration and no aspiration and no nasal regurgitation.  The capsule with water (with head turn to L) was swallowed safely (although some portion of the water may have been aspirated based on above).   There was decreased BOT movement or epiglottic inversion.      Cervical Esophageal:  Boluses entered the upper esophagus within normal limits.  No obstruction was noted. There was reduced UES opening.      Rosenbeck 8-point Penetration-Aspiration Scale:  Worst:  8 - Material enters the airway, passes below the vocal folds, and no effort is made to eject.   Best:    1 - Material does not enter airway.     NOTE:  The esophagram that had been planned to follow this study was cancelled due to his swallow functioning with liquids.      IMPRESSIONS:   This 62 y.o. man appears to present with  1.  Moderate-severe pharyngesophageal dysphagia characterized by reduce strength and degree of contraction of pharyngeal structures (particularly BOT, epiglottis) with reduced UES opening likely related to poorer pressures above that level.  There was SILENT aspiration of portions of thin and nectar-thick liquids regardless of strategy and no penetration or aspiration of thin or thick pureed foods with a head turn to the L.  Capsule was swallowed safely.  2.  No history of pna or other aspiration-related illness to date.   3.  At-risk worsening swallowing functioning if the observed radiation changes in his upper aerodigestive system continue to progress.      Swallowing  Current status:  FCM:  LEVEL 4:  Swallowing is safe, but usually requires moderate cues to use compensatory strategies, and/or the individual has moderate diet restrictions and/or still requires tube feeding and/or oral supplements.   - CK  Projected status:  FCM:   LEVEL 4: Swallowing is safe, but usually requires moderate cues to use compensatory strategies, and/or the individual has moderate diet restrictions and/or still requires tube feeding and/or oral supplements.   - CK  Discharge status:  FCM:   LEVEL 4: Swallowing is safe, but usually requires moderate cues to use compensatory strategies, and/or the individual has moderate diet restrictions and/or still requires tube feeding and/or oral supplements.   -  CK       RECOMMENDATIONS/PLAN OF CARE:   It is felt that Mr. Arias would benefit from  1.  Continuation of his current soft food diet with thin liquids for now.  The rationale for this is that he has not yet exhibited an aspiration-related illness.  It is noted that this may be short-lived.  He should continue using the following strategies and common aspiration precautions, including, but not limited to   A.  Appropriate seating for all eating and drinking.   B.  Neutral head position for small sips of liquids (5-ml) as he actually had the best air way protection in this posture.   C.  Continue head turn to L for pureed and soft foods in small bite sizes (1/2 teaspoon).     D.  Continue capsules in water or in puree and tablet in puree.   E.  Monitoring for any signs/symptoms of aspiration (such as wet/gurgly voice that does not clear with coughing, inability to make any voice sounds, any persistent coughing with oral intake, otherwise unexplained fever, unexplained increased or new difficulty or discomfort breathing, unexplained increase in sleepiness/lethargy/significant fatigue, unexplained increase or new onset confusion or change in cognitive functioning, or any other unexplained change in health or well-being that  could be related to swallowing).  2.  Return to my office for 1-2 visits for resumption/initiation of swallowing exercises plus review of strategies/products that may help xerostomia.  3.  Continued f/u with Dr. Gore as directed.      Long-term goals:  Mr. Arias will be able to consume a soft diet with thin liquids with no signs/symptoms of aspiration.      Short-term objectives:  Mr. Arias will  1.  Continue his current soft food diet with thin liquids for now.  The rationale for this is that he has not yet exhibited an aspiration-related illness.  It is noted that this may be short-lived.  He should continue using the following strategies and common aspiration precautions, including, but not limited to   A.  Appropriate seating for all eating and drinking.   B.  Neutral head position for small sips of liquids (5-ml) as he actually had the best air way protection in this posture.   C.  Continue head turn to L for pureed and soft foods in small bite sizes (1/2 teaspoon).     D.  Continue capsules in water or in puree and tablet in puree.   E.  Monitoring for any signs/symptoms of aspiration (such as wet/gurgly voice that does not clear with coughing, inability to make any voice sounds, any persistent coughing with oral intake, otherwise unexplained fever, unexplained increased or new difficulty or discomfort breathing, unexplained increase in sleepiness/lethargy/significant fatigue, unexplained increase or new onset confusion or change in cognitive functioning, or any other unexplained change in health or well-being that could be related to swallowing).  2.  Return to my office for 1-2 visits for resumption/initiation of swallowing exercises plus review of strategies/products that may help xerostomia.   A.  Demonstrate execution of prescribed swallowing exercises with % accuracy.   B.  Perform home program 4x/day per report.   C.  Demonstrate understanding of options to treat xerostomia (short of physician  prescription) by telling at least two strategies/options.    Physician's Comments:   I agree with the above recommendations    Physician's Name:  James Gore M.D.

## 2017-03-14 ENCOUNTER — TELEPHONE (OUTPATIENT)
Dept: SPEECH THERAPY | Facility: HOSPITAL | Age: 62
End: 2017-03-14

## 2017-03-14 ENCOUNTER — OFFICE VISIT (OUTPATIENT)
Dept: INTERNAL MEDICINE | Facility: CLINIC | Age: 62
End: 2017-03-14
Payer: COMMERCIAL

## 2017-03-14 VITALS
HEART RATE: 68 BPM | HEIGHT: 73 IN | SYSTOLIC BLOOD PRESSURE: 116 MMHG | WEIGHT: 205.69 LBS | TEMPERATURE: 98 F | BODY MASS INDEX: 27.26 KG/M2 | DIASTOLIC BLOOD PRESSURE: 66 MMHG

## 2017-03-14 DIAGNOSIS — E78.5 DYSLIPIDEMIA: Chronic | ICD-10-CM

## 2017-03-14 DIAGNOSIS — N18.30 CKD (CHRONIC KIDNEY DISEASE) STAGE 3, GFR 30-59 ML/MIN: Chronic | ICD-10-CM

## 2017-03-14 DIAGNOSIS — J31.0 CHRONIC RHINITIS: Chronic | ICD-10-CM

## 2017-03-14 DIAGNOSIS — R13.14 DYSPHAGIA, PHARYNGOESOPHAGEAL: Primary | ICD-10-CM

## 2017-03-14 DIAGNOSIS — Z86.73 S/P STROKE DUE TO CEREBROVASCULAR DISEASE: Chronic | ICD-10-CM

## 2017-03-14 DIAGNOSIS — R47.1 DYSARTHRIA: Chronic | ICD-10-CM

## 2017-03-14 DIAGNOSIS — Z92.3 HISTORY OF HEAD AND NECK RADIATION: ICD-10-CM

## 2017-03-14 DIAGNOSIS — I10 ESSENTIAL HYPERTENSION: Primary | Chronic | ICD-10-CM

## 2017-03-14 PROCEDURE — 99214 OFFICE O/P EST MOD 30 MIN: CPT | Mod: S$GLB,,, | Performed by: INTERNAL MEDICINE

## 2017-03-14 PROCEDURE — 99999 PR PBB SHADOW E&M-EST. PATIENT-LVL III: CPT | Mod: PBBFAC,,, | Performed by: INTERNAL MEDICINE

## 2017-03-14 PROCEDURE — 3078F DIAST BP <80 MM HG: CPT | Mod: S$GLB,,, | Performed by: INTERNAL MEDICINE

## 2017-03-14 PROCEDURE — 1160F RVW MEDS BY RX/DR IN RCRD: CPT | Mod: S$GLB,,, | Performed by: INTERNAL MEDICINE

## 2017-03-14 PROCEDURE — 3074F SYST BP LT 130 MM HG: CPT | Mod: S$GLB,,, | Performed by: INTERNAL MEDICINE

## 2017-03-14 RX ORDER — CHLORHEXIDINE GLUCONATE ORAL RINSE 1.2 MG/ML
SOLUTION DENTAL
Refills: 0 | COMMUNITY
Start: 2017-03-02

## 2017-03-14 NOTE — PROGRESS NOTES
Subjective:       Patient ID: Alfonso Arias is a 62 y.o. male.    Chief Complaint: Follow-up    HPI   The patient presents for follow-up evaluation of medical conditions.  He has hypertension, dyslipidemia, and GERD.  He continues to experience difficulty swallowing and speaking.  These complications have followed treatment for oral cancer and a CVA respectively.  He has to turn his head to the left to swallow when eating.  His diet has to be modified to allow ingestion of meat.  Review of Systems   Constitutional: Negative for activity change, appetite change, fatigue and unexpected weight change.   HENT: Positive for trouble swallowing.    Eyes: Negative for visual disturbance.   Respiratory: Negative for cough, chest tightness, shortness of breath and wheezing.    Cardiovascular: Negative for chest pain, palpitations and leg swelling.   Gastrointestinal: Negative for abdominal pain, blood in stool, nausea and vomiting.   Genitourinary: Negative for dysuria, hematuria and urgency.   Musculoskeletal: Negative for arthralgias, back pain, gait problem, joint swelling and myalgias.   Skin: Negative for color change and rash.   Neurological: Positive for speech difficulty and weakness. Negative for dizziness, syncope, light-headedness, numbness and headaches.   Psychiatric/Behavioral: Negative for sleep disturbance.       Objective:      Physical Exam   Constitutional: He is oriented to person, place, and time. He appears well-developed and well-nourished. No distress.   HENT:   Head: Normocephalic and atraumatic.   Eyes: Conjunctivae and EOM are normal. No scleral icterus.   Neck: Normal range of motion. Neck supple. No JVD present. No thyromegaly present.   Cardiovascular: Normal rate, regular rhythm, normal heart sounds and intact distal pulses.  Exam reveals no gallop and no friction rub.    No murmur heard.  Pulmonary/Chest: Effort normal and breath sounds normal. No respiratory distress. He has no wheezes. He  has no rales.   Abdominal: Soft. Bowel sounds are normal. He exhibits no mass. There is no tenderness.   Musculoskeletal: Normal range of motion. He exhibits no tenderness.   Joint stiffness is present with decreased flexion of the fingers noted of the left hand.   Lymphadenopathy:     He has no cervical adenopathy.   Neurological: He is alert and oriented to person, place, and time.   Gait is normal. Dysarthria is noted.   Skin: Skin is warm and dry. No rash noted.   Psychiatric: He has a normal mood and affect. His behavior is normal.   Nursing note and vitals reviewed.      Results for orders placed or performed in visit on 02/24/17   CBC auto differential   Result Value Ref Range    WBC 6.39 3.90 - 12.70 K/uL    RBC 4.53 (L) 4.60 - 6.20 M/uL    Hemoglobin 12.3 (L) 14.0 - 18.0 g/dL    Hematocrit 38.1 (L) 40.0 - 54.0 %    MCV 84 82 - 98 fL    MCH 27.2 27.0 - 31.0 pg    MCHC 32.3 32.0 - 36.0 %    RDW 13.9 11.5 - 14.5 %    Platelets 287 150 - 350 K/uL    MPV 10.7 9.2 - 12.9 fL    Gran # 4.0 1.8 - 7.7 K/uL    Lymph # 1.5 1.0 - 4.8 K/uL    Mono # 0.5 0.3 - 1.0 K/uL    Eos # 0.3 0.0 - 0.5 K/uL    Baso # 0.03 0.00 - 0.20 K/uL    Gran% 62.8 38.0 - 73.0 %    Lymph% 23.2 18.0 - 48.0 %    Mono% 8.1 4.0 - 15.0 %    Eosinophil% 5.2 0.0 - 8.0 %    Basophil% 0.5 0.0 - 1.9 %    Differential Method Automated    Comprehensive metabolic panel   Result Value Ref Range    Sodium 139 136 - 145 mmol/L    Potassium 4.6 3.5 - 5.1 mmol/L    Chloride 106 95 - 110 mmol/L    CO2 27 23 - 29 mmol/L    Glucose 98 70 - 110 mg/dL    BUN, Bld 18 8 - 23 mg/dL    Creatinine 1.5 (H) 0.5 - 1.4 mg/dL    Calcium 9.7 8.7 - 10.5 mg/dL    Total Protein 8.2 6.0 - 8.4 g/dL    Albumin 3.4 (L) 3.5 - 5.2 g/dL    Total Bilirubin 0.3 0.1 - 1.0 mg/dL    Alkaline Phosphatase 76 55 - 135 U/L    AST 16 10 - 40 U/L    ALT 10 10 - 44 U/L    Anion Gap 6 (L) 8 - 16 mmol/L    eGFR if African American 56.1 (A) >60 mL/min/1.73 m^2    eGFR if non  48.5 (A)  >60 mL/min/1.73 m^2       Assessment:       1. Essential hypertension    2. Chronic rhinitis    3. Dysarthria    4. Dyslipidemia    5. CKD (chronic kidney disease) stage 3, GFR 30-59 ml/min    6. S/P stroke due to cerebrovascular disease        Plan:           Alfonso was seen today for follow-up.  Current therapy will be continued.  Blood tests and a follow-up visit in 4 months will be obtained.    Diagnoses and all orders for this visit:    Essential hypertension    Chronic rhinitis    Dyslipidemia    Dysarthria    CKD (chronic kidney disease) stage 3, GFR 30-59 ml/min    S/P stroke due to cerebrovascular disease

## 2017-03-14 NOTE — PATIENT INSTRUCTIONS
RECOMMENDATIONS/PLAN OF CARE:   It is felt that Mr. Arias would benefit from  1.  Continuation of his current soft food diet with thin liquids for now.  The rationale for this is that he has not yet exhibited an aspiration-related illness.  It is noted that this may be short-lived.  He should continue using the following strategies and common aspiration precautions, including, but not limited to   A.  Appropriate seating for all eating and drinking.   B.  Neutral head position for small sips of liquids (5-ml) as he actually had the best air way protection in this posture.   C.  Continue head turn to L for pureed and soft foods in small bite sizes (1/2 teaspoon).     D.  Continue capsules in water or in puree and tablet in puree.   E.  Monitoring for any signs/symptoms of aspiration (such as wet/gurgly voice that does not clear with coughing, inability to make any voice sounds, any persistent coughing with oral intake, otherwise unexplained fever, unexplained increased or new difficulty or discomfort breathing, unexplained increase in sleepiness/lethargy/significant fatigue, unexplained increase or new onset confusion or change in cognitive functioning, or any other unexplained change in health or well-being that could be related to swallowing).  2.  Return to my office for 1-2 visits for resumption/initiation of swallowing exercises plus review of strategies/products that may help xerostomia.  3.  Continued f/u with Dr. Gore as directed.

## 2017-03-15 ENCOUNTER — TELEPHONE (OUTPATIENT)
Dept: SPEECH THERAPY | Facility: HOSPITAL | Age: 62
End: 2017-03-15

## 2017-03-17 ENCOUNTER — TELEPHONE (OUTPATIENT)
Dept: INTERNAL MEDICINE | Facility: CLINIC | Age: 62
End: 2017-03-17

## 2017-03-21 ENCOUNTER — CLINICAL SUPPORT (OUTPATIENT)
Dept: SPEECH THERAPY | Facility: HOSPITAL | Age: 62
End: 2017-03-21
Attending: OTOLARYNGOLOGY
Payer: COMMERCIAL

## 2017-03-21 DIAGNOSIS — Z85.810 HISTORY OF TONGUE CANCER: ICD-10-CM

## 2017-03-21 DIAGNOSIS — R13.14 DYSPHAGIA, PHARYNGOESOPHAGEAL: Primary | ICD-10-CM

## 2017-03-21 DIAGNOSIS — Z92.3 HISTORY OF HEAD AND NECK RADIATION: ICD-10-CM

## 2017-03-21 PROCEDURE — G8996 SWALLOW CURRENT STATUS: HCPCS | Mod: GN,CK | Performed by: SPEECH-LANGUAGE PATHOLOGIST

## 2017-03-21 PROCEDURE — G8997 SWALLOW GOAL STATUS: HCPCS | Mod: GN,CK | Performed by: SPEECH-LANGUAGE PATHOLOGIST

## 2017-03-21 PROCEDURE — 92526 ORAL FUNCTION THERAPY: CPT | Mod: GN | Performed by: SPEECH-LANGUAGE PATHOLOGIST

## 2017-03-21 PROCEDURE — G8998 SWALLOW D/C STATUS: HCPCS | Mod: GN,CK | Performed by: SPEECH-LANGUAGE PATHOLOGIST

## 2017-03-21 RX ORDER — ATORVASTATIN CALCIUM 40 MG/1
TABLET, FILM COATED ORAL
Qty: 30 TABLET | Refills: 10 | Status: SHIPPED | OUTPATIENT
Start: 2017-03-21 | End: 2017-05-30 | Stop reason: SDUPTHER

## 2017-03-21 NOTE — PROGRESS NOTES
DIAGNOSIS:  Dysphagia, pharyngoesophageal (R13.14), History H&N radiation (Z92.3), History tongue cancer (Z85.810)  REFERRING DOCTOR:  James Gore M.D., Head and Neck Surgical Oncologist    LENGTH OF SESSION:  30 minutes    REASON FOR VISIT:  Review/retraining in swallowing exercises s/p MBSS 3/8/17.    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 ([place], [place])    SWALLOWING HISTORY:  He has a history of SCCA L BOT with completion of CRT in ~2007: Completion of CRT ([place], [place]). Per Dr. Gore's note of 2/16/17, Mr. Arias has had dysphagia since his treatment. He reported improved swallow functioning after esophageal dilation in the past and saw Dr. Gore to determine if this or other treatment may be beneficial at present. Per his nasendoscopic exam, there are radiation changes throughout the pharyngolarynx.     His dysphagia was complicated by a known stroke event in early 2015 (R subcortical), but imaging indicates that there have likely been multiple ischemic CVAs.     MBSS 3/8/17 revealed moderate-severe pharyngesophageal dysphagia characterized by reduced strength and degree of contraction of pharyngeal structures (particularly BOT, epiglottis) with reduced UES opening likely related to poorer pressures above that level. There was SILENT aspiration of portions of thin and nectar-thick liquids regardless of strategy and no penetration or aspiration of thin or thick pureed foods with a head turn to the L. Capsule was swallowed safely.    SOCIAL HISTORY:  New information Mr. Arias did not relate at the time of the MBSS:  * He and Mrs. Arias plan to return to his home town of [place] in June.  He plans to keep his physicians here, at least initially.  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    INTERVAL HISTORY:  Mr. Arias reported that he had been maintaining his swallowing per  "usual.    INTERVENTION:  Short-term objectives:  Mr. Arias will  1. Continue his current soft food diet with thin liquids for now. The rationale for this is that he has not yet exhibited an aspiration-related illness. It is noted that this may be short-lived. He should continue using the following strategies and common aspiration precautions, including, but not limited to   A. Appropriate seating for all eating and drinking.   B. Neutral head position for small sips of liquids (5-ml) as he actually had the best air way protection in this posture.   C. Continue head turn to L for pureed and soft foods in small bite sizes (1/2 teaspoon).    D. Continue capsules in water or in puree and tablet in puree.   E. Monitoring for any signs/symptoms of aspiration (such as wet/gurgly voice that does not clear with coughing, inability to make any voice sounds, any persistent coughing with oral intake, otherwise unexplained fever, unexplained increased or new difficulty or discomfort breathing, unexplained increase in sleepiness/lethargy/significant fatigue, unexplained increase or new onset confusion or change in cognitive functioning, or any other unexplained change in health or well-being that could be related to swallowing).   Being met.    2. Return to my office for 1-2 visits for resumption/initiation of swallowing exercises plus review of strategies/products that may help xerostomia.   Goal met 3/21/17.  A. Demonstrate execution of prescribed swallowing exercises with % accuracy.   Strategies/techniques:  1. Appropriate seating  2. Smaller size of sips and bites  3. Swallowing one sip and bite at a time  4. Rate of eating and drinking  5. Dry swallows  6. Alternate liquid and solid swallows  7. Supraglottic swallow    Exercises:  Perform 4 times per day, 10 repetitions:  1. Breath-hold maneuver  2. Mendelsohn maneuver ("kick" modification)  3. Tongue base retraction  4. Tongue-anchor swallow (Madeleine)   5. Effortful " swallow    B. Perform home program 4x/day per report.   Introduced today; expect compliance.    C. Demonstrate understanding of options to treat xerostomia (short of physician prescription) by telling at least two strategies/options.   Using baking soda and salt with good relief by report.    NOTE:  He has increased secretions of a viscous nature with increased talking and has to stop and spit them out periodically.     IMPRESSIONS:   This 62 y.o. man appears to present with  1. Per 3/8/17 MBSS, moderate-severe pharyngesophageal dysphagia characterized by reduce strength and degree of contraction of pharyngeal structures (particularly BOT, epiglottis) with reduced UES opening likely related to poorer pressures above that level. There was SILENT aspiration of portions of thin and nectar-thick liquids regardless of strategy and no penetration or aspiration of thin or thick pureed foods with a head turn to the L. Capsule was swallowed safely.  2. No history of pna or other aspiration-related illness to date.   3. At-risk worsening swallowing functioning if the observed radiation changes in his upper aerodigestive system continue to progress.  4.  History head and neck radiation.  5.  History L BOT cancer.  6.  History of R subcortical CVA 2015 plus apparent multiple ischemic CVAs per imaging.        Swallowing  Current status: FCM: LEVEL 4: Swallowing is safe, but usually requires moderate cues to use compensatory strategies, and/or the individual has moderate diet restrictions and/or still requires tube feeding and/or oral supplements.  - CK  Projected status: FCM: LEVEL 4: Swallowing is safe, but usually requires moderate cues to use compensatory strategies, and/or the individual has moderate diet restrictions and/or still requires tube feeding and/or oral supplements.  - CK  Discharge status: FCM: LEVEL 4: Swallowing is safe, but usually requires moderate cues to use compensatory strategies, and/or the  individual has moderate diet restrictions and/or still requires tube feeding and/or oral supplements.  - CK         RECOMMENDATIONS/PLAN OF CARE:   It is felt that Mr. Arias would benefit from  1. Continuation of his current soft food diet with thin liquids for now with safe swallowing strategies and common aspiration precautions as above.  2. Continue swallowing exercises in home program 4x/day indefinitely.  3. Continued f/u with Dr. Gore as directed.  4.  Reconsult as needed for repeat MBSS or further dysphagia therapy.        Long-term goals:  Mr. Arias will be able to consume a soft diet with thin liquids with no signs/symptoms of aspiration.      Physician's Comments:   I agree with the above recommendations    Physician's Name:  James Gore M.D.

## 2017-03-21 NOTE — MR AVS SNAPSHOT
[facility]  [address]  [facility] [address]  Phone: [phone]                  Alfonso Arias   3/21/2017 2:00 PM   Clinical Support    Description:  Male : 1955   Provider:  Jennifer Aj MA, CCC-SLP   Department:  [facility]           Reason for Visit     SLP Treatment           Diagnoses this Visit        Comments    Dysphagia, pharyngoesophageal    -  Primary     History of head and neck radiation         History of tongue cancer     L BOT           To Do List           Future Appointments        Provider Department Dept Phone    2017 7:30 AM LAB, [facility] - Laboratory [phone]    2017 2:40 PM MD Micah Quintanilla - Internal Medicine [phone]      Goals (5 Years of Data)     None      Follow-Up and Disposition     Return if symptoms worsen or fail to improve.      [facility]     [facility] Care [facility] -  Assistance  Registered nurses in the [facility] provide clinical advisement, health education, appointment booking, and other advisory services.  Call for this free service at [phone].             Medications           Message regarding Medications     Verify the changes and/or additions to your medication regime listed below are the same as discussed with your clinician today.  If any of these changes or additions are incorrect, please notify your healthcare provider.             Verify that the below list of medications is an accurate representation of the medications you are currently taking.  If none reported, the list may be blank. If incorrect, please contact your healthcare provider. Carry this list with you in case of emergency.           Current Medications     amlodipine (NORVASC) 2.5 MG tablet TAKE ONE TABLET BY MOUTH ONE TIME DAILY    aspirin (ECOTRIN) 81 MG EC tablet Take 1 tablet (81 mg total) by mouth once daily.    atorvastatin (LIPITOR) 40 MG tablet TAKE 1  TABLET BY MOUTH ONE TIME DAILY    chlorhexidine (PERIDEX) 0.12 % solution RINSE MOUTH WITH ONE CAPFUL FOR 20 SECONDS TWICE DAILY    ferrous gluconate (FERGON) 325 MG Tab Take 1 tablet (325 mg total) by mouth daily with breakfast.    ferrous gluconate 324 mg (37.5 mg iron) Tab Take 1 tablet by mouth once daily.    ferrous gluconate 324 mg (37.5 mg iron) Tab TAKE 1 TABLET BY MOUTH DAILY WITH BREAKFAST    hydrocodone-acetaminophen 5-325mg (NORCO) 5-325 mg per tablet Take 1 tablet by mouth every 6 (six) hours as needed for Pain (severe pain).    metoprolol tartrate (LOPRESSOR) 25 MG tablet Take 1 tablet (25 mg total) by mouth 2 (two) times daily.    pantoprazole (PROTONIX) 40 MG tablet TAKE ONE TABLET BY MOUTH ONE TIME DAILY           Clinical Reference Information           Allergies as of 3/21/2017     No Known Allergies      Immunizations Administered on Date of Encounter - 3/21/2017     None      Orders Placed During Today's Visit      Normal Orders This Visit    SLP treatment for swallowing       Instructions    RECOMMENDATIONS/PLAN OF CARE:   It is felt that Mr. Arias would benefit from  1. Continuation of his current soft food diet with thin liquids for now with safe swallowing strategies and common aspiration precautions as above.  2. Continue swallowing exercises in home program 4x/day indefinitely.  3. Continued f/u with Dr. Gore as directed.  4.  Reconsult as needed for repeat MBSS or further dysphagia therapy.       Language Assistance Services     ATTENTION: Language assistance services are available, free of charge. Please call [phone].      ATENCIÓN: Si habla español, tiene a spencer disposición servicios gratuitos de asistencia lingüística. Llame al [phone].     [facility] Ý: N?u b?n nói Ti?ng Vi?t, có các d?ch v? h? tr? ngôn ng? mi?n phí dành cho b?n. G?i s? [phone].         [facility] complies with applicable Federal civil rights laws and does not discriminate on the  basis of race, color, national origin, age, disability, or sex.

## 2017-03-21 NOTE — PATIENT INSTRUCTIONS
RECOMMENDATIONS/PLAN OF CARE:   It is felt that Mr. Arias would benefit from  1. Continuation of his current soft food diet with thin liquids for now with safe swallowing strategies and common aspiration precautions as above.  2. Continue swallowing exercises in home program 4x/day indefinitely.  3. Continued f/u with Dr. Gore as directed.  4.  Reconsult as needed for repeat MBSS or further dysphagia therapy.

## 2017-03-28 RX ORDER — AMLODIPINE BESYLATE 2.5 MG/1
TABLET ORAL
Qty: 30 TABLET | Refills: 10 | Status: SHIPPED | OUTPATIENT
Start: 2017-03-28 | End: 2017-05-30 | Stop reason: SDUPTHER

## 2017-03-28 RX ORDER — METOPROLOL TARTRATE 25 MG/1
TABLET, FILM COATED ORAL
Qty: 60 TABLET | Refills: 10 | Status: SHIPPED | OUTPATIENT
Start: 2017-03-28 | End: 2017-05-30 | Stop reason: SDUPTHER

## 2017-04-17 ENCOUNTER — TELEPHONE (OUTPATIENT)
Dept: INTERNAL MEDICINE | Facility: CLINIC | Age: 62
End: 2017-04-17

## 2017-04-17 NOTE — TELEPHONE ENCOUNTER
----- Message from Aliyah Boo sent at 4/13/2017  2:21 PM CDT -----  Contact: pt [phone] or [phone]  Pt would like a call in regards to [employer] paper work,pt said this is the second message,please advise

## 2017-04-17 NOTE — TELEPHONE ENCOUNTER
----- Message from Sulma Lorenzo sent at 4/13/2017  8:57 AM CDT -----  Contact: Julienne Arias (Spouse)[phone] until 3:30p.m.  Pt wife stated [employer] insurance will be faxing over paper work which she (pt wife) would like to discuss it with nurse. Refuse to give any more details.    Please advise

## 2017-04-21 ENCOUNTER — TELEPHONE (OUTPATIENT)
Dept: INTERNAL MEDICINE | Facility: CLINIC | Age: 62
End: 2017-04-21

## 2017-04-21 NOTE — TELEPHONE ENCOUNTER
----- Message from Aliyah Boo sent at 4/21/2017 10:41 AM CDT -----  Contact: pt [phone]  Pt would like to know if the Dr sent his disability paper work in to [facility] ,please advise

## 2017-04-24 ENCOUNTER — TELEPHONE (OUTPATIENT)
Dept: INTERNAL MEDICINE | Facility: CLINIC | Age: 62
End: 2017-04-24

## 2017-04-24 NOTE — TELEPHONE ENCOUNTER
----- Message from Jessica Albert sent at 4/24/2017 12:33 PM CDT -----  Contact: call  [phone]  Pt is calling to check status of disability paper work

## 2017-05-30 RX ORDER — METOPROLOL TARTRATE 25 MG/1
25 TABLET, FILM COATED ORAL 2 TIMES DAILY
Qty: 60 TABLET | Refills: 10 | Status: SHIPPED | OUTPATIENT
Start: 2017-05-30 | End: 2017-11-10 | Stop reason: SDUPTHER

## 2017-05-30 RX ORDER — AMLODIPINE BESYLATE 2.5 MG/1
2.5 TABLET ORAL DAILY
Qty: 30 TABLET | Refills: 10 | Status: SHIPPED | OUTPATIENT
Start: 2017-05-30

## 2017-05-30 RX ORDER — ATORVASTATIN CALCIUM 40 MG/1
TABLET, FILM COATED ORAL
Qty: 30 TABLET | Refills: 10 | Status: SHIPPED | OUTPATIENT
Start: 2017-05-30 | End: 2018-02-10 | Stop reason: SDUPTHER

## 2017-05-30 RX ORDER — FERROUS GLUCONATE 324(37.5)
1 TABLET ORAL DAILY
Refills: 5 | COMMUNITY
Start: 2017-05-30 | End: 2017-11-10 | Stop reason: SDUPTHER

## 2017-05-30 RX ORDER — PANTOPRAZOLE SODIUM 40 MG/1
40 TABLET, DELAYED RELEASE ORAL DAILY
Qty: 30 TABLET | Refills: 4 | Status: SHIPPED | OUTPATIENT
Start: 2017-05-30 | End: 2017-07-27 | Stop reason: SDUPTHER

## 2017-05-30 NOTE — TELEPHONE ENCOUNTER
----- Message from Kriss Sheikh sent at 5/30/2017 10:17 AM CDT -----  Contact: patient [phone]  [facility] will be contacting you to refill several rx's .

## 2017-07-13 ENCOUNTER — HISTORICAL (OUTPATIENT)
Dept: RADIOLOGY | Facility: HOSPITAL | Age: 62
End: 2017-07-13

## 2017-07-17 ENCOUNTER — LAB VISIT (OUTPATIENT)
Dept: LAB | Facility: HOSPITAL | Age: 62
End: 2017-07-17
Attending: INTERNAL MEDICINE
Payer: COMMERCIAL

## 2017-07-17 DIAGNOSIS — E78.5 DYSLIPIDEMIA: Chronic | ICD-10-CM

## 2017-07-17 DIAGNOSIS — I10 ESSENTIAL HYPERTENSION: Chronic | ICD-10-CM

## 2017-07-17 LAB
ALBUMIN SERPL BCP-MCNC: 3.8 G/DL
ALP SERPL-CCNC: 69 U/L
ALT SERPL W/O P-5'-P-CCNC: 12 U/L
ANION GAP SERPL CALC-SCNC: 8 MMOL/L
AST SERPL-CCNC: 18 U/L
BASOPHILS # BLD AUTO: 0.02 K/UL
BASOPHILS NFR BLD: 0.3 %
BILIRUB SERPL-MCNC: 0.3 MG/DL
BUN SERPL-MCNC: 17 MG/DL
CALCIUM SERPL-MCNC: 9.8 MG/DL
CHLORIDE SERPL-SCNC: 103 MMOL/L
CHOLEST/HDLC SERPL: 2.5 {RATIO}
CO2 SERPL-SCNC: 28 MMOL/L
CREAT SERPL-MCNC: 1.4 MG/DL
DIFFERENTIAL METHOD: ABNORMAL
EOSINOPHIL # BLD AUTO: 0.3 K/UL
EOSINOPHIL NFR BLD: 4.7 %
ERYTHROCYTE [DISTWIDTH] IN BLOOD BY AUTOMATED COUNT: 14.3 %
EST. GFR  (AFRICAN AMERICAN): >60 ML/MIN/1.73 M^2
EST. GFR  (NON AFRICAN AMERICAN): 52.7 ML/MIN/1.73 M^2
GLUCOSE SERPL-MCNC: 105 MG/DL
HCT VFR BLD AUTO: 39.4 %
HDL/CHOLESTEROL RATIO: 39.4 %
HDLC SERPL-MCNC: 39 MG/DL
HDLC SERPL-MCNC: 99 MG/DL
HGB BLD-MCNC: 12.5 G/DL
LDLC SERPL CALC-MCNC: 45.6 MG/DL
LYMPHOCYTES # BLD AUTO: 1.4 K/UL
LYMPHOCYTES NFR BLD: 20.6 %
MCH RBC QN AUTO: 26.9 PG
MCHC RBC AUTO-ENTMCNC: 31.7 %
MCV RBC AUTO: 85 FL
MONOCYTES # BLD AUTO: 0.8 K/UL
MONOCYTES NFR BLD: 11.4 %
NEUTROPHILS # BLD AUTO: 4.2 K/UL
NEUTROPHILS NFR BLD: 63 %
NONHDLC SERPL-MCNC: 60 MG/DL
PLATELET # BLD AUTO: 237 K/UL
PMV BLD AUTO: 10.9 FL
POTASSIUM SERPL-SCNC: 4.3 MMOL/L
PROT SERPL-MCNC: 8.4 G/DL
RBC # BLD AUTO: 4.64 M/UL
SODIUM SERPL-SCNC: 139 MMOL/L
TRIGL SERPL-MCNC: 72 MG/DL
WBC # BLD AUTO: 6.6 K/UL

## 2017-07-17 PROCEDURE — 80053 COMPREHEN METABOLIC PANEL: CPT

## 2017-07-17 PROCEDURE — 85025 COMPLETE CBC W/AUTO DIFF WBC: CPT

## 2017-07-17 PROCEDURE — 36415 COLL VENOUS BLD VENIPUNCTURE: CPT | Mod: PO

## 2017-07-17 PROCEDURE — 80061 LIPID PANEL: CPT

## 2017-07-21 ENCOUNTER — OFFICE VISIT (OUTPATIENT)
Dept: INTERNAL MEDICINE | Facility: CLINIC | Age: 62
End: 2017-07-21
Payer: COMMERCIAL

## 2017-07-21 VITALS
HEART RATE: 60 BPM | BODY MASS INDEX: 27.26 KG/M2 | DIASTOLIC BLOOD PRESSURE: 62 MMHG | HEIGHT: 72 IN | TEMPERATURE: 98 F | WEIGHT: 201.25 LBS | SYSTOLIC BLOOD PRESSURE: 106 MMHG

## 2017-07-21 DIAGNOSIS — I10 ESSENTIAL HYPERTENSION: Primary | Chronic | ICD-10-CM

## 2017-07-21 DIAGNOSIS — M27.9 JAW DISEASE: ICD-10-CM

## 2017-07-21 DIAGNOSIS — Z86.73 S/P STROKE DUE TO CEREBROVASCULAR DISEASE: Chronic | ICD-10-CM

## 2017-07-21 DIAGNOSIS — G81.94 LEFT HEMIPARESIS: ICD-10-CM

## 2017-07-21 DIAGNOSIS — R47.1 DYSARTHRIA: Chronic | ICD-10-CM

## 2017-07-21 PROCEDURE — 99999 PR PBB SHADOW E&M-EST. PATIENT-LVL III: CPT | Mod: PBBFAC,,, | Performed by: INTERNAL MEDICINE

## 2017-07-21 PROCEDURE — 99214 OFFICE O/P EST MOD 30 MIN: CPT | Mod: S$GLB,,, | Performed by: INTERNAL MEDICINE

## 2017-07-21 RX ORDER — HYDROCODONE BITARTRATE AND ACETAMINOPHEN 5; 325 MG/1; MG/1
1 TABLET ORAL EVERY 8 HOURS PRN
Qty: 60 TABLET | Refills: 0 | Status: SHIPPED | OUTPATIENT
Start: 2017-07-21 | End: 2018-01-16 | Stop reason: SDUPTHER

## 2017-07-26 NOTE — PROGRESS NOTES
Subjective:       Patient ID: Alfonso Arias is a 62 y.o. male.    Chief Complaint: Follow-up    HPI   The patient presents for follow-up of hypertension and other medical conditions.  He is tolerating his blood pressure medication well.  He is status post old CVA with residual left hemiparesis and dysarthria.  He still has difficulty using his left upper extremity.  He continues to have swallowing difficulty following his stroke.  He has to turn his head to swallow solid foods.  His wife reports that she makes sure that most of his foods are soft, minced, or puréed.  Swallowing difficulty is also felt to be related to oral CA radiation therapy.  He has not experienced any falls.  He does experience muscle fatigue particularly involving the left lower extremity following prolonged standing or walking.    The patient recently underwent evaluation for hyperbaric treatment at [facility] for treatment of continuing degeneration of the jaw bone noted at the time of evaluation and treatment for an abscessed tooth.  Degenerative changes are noted involving both lower jaw areas.  He has completed antibiotic therapy.  He is still noting jaw pain however.  A CT scan has been ordered.  The patient is awaiting follow-up evaluation.  Degeneration of the jawbone is felt to be due to previous radiation therapy that he received for oral cancer.  Review of Systems   Constitutional: Negative for activity change, appetite change, fatigue and unexpected weight change.   HENT: Positive for dental problem, trouble swallowing and voice change. Negative for mouth sores, sinus pressure and sore throat.    Eyes: Negative for visual disturbance.   Respiratory: Negative for cough, chest tightness, shortness of breath and wheezing.    Cardiovascular: Negative for chest pain, palpitations and leg swelling.   Gastrointestinal: Negative for abdominal pain, blood in stool, nausea and vomiting.   Genitourinary: Negative for dysuria,  hematuria and urgency.   Musculoskeletal: Negative for arthralgias, back pain, gait problem, joint swelling, myalgias and neck stiffness.   Skin: Negative for color change and rash.   Neurological: Positive for weakness. Negative for dizziness, seizures, syncope, light-headedness, numbness and headaches.   Psychiatric/Behavioral: Negative for sleep disturbance.       Objective:      Physical Exam   Constitutional: He is oriented to person, place, and time. He appears well-developed and well-nourished. No distress.   HENT:   Head: Normocephalic and atraumatic.   Eyes: Conjunctivae and EOM are normal. No scleral icterus.   Mild swelling is noted of the lower jaw particularly on the  left side.   Neck: Normal range of motion. Neck supple. No JVD present. No thyromegaly present.   Cardiovascular: Normal rate, regular rhythm, normal heart sounds and intact distal pulses.  Exam reveals no gallop and no friction rub.    No murmur heard.  Pulmonary/Chest: Effort normal and breath sounds normal. No respiratory distress. He has no wheezes. He has no rales.   Abdominal: Soft. Bowel sounds are normal. He exhibits no mass. There is no tenderness.   Musculoskeletal: He exhibits no tenderness.   Decreased flexion of the fingers of the left hand is noted.  Joint stiffness is present.  Decreased handgrip on the left is noted compared to the right.   Lymphadenopathy:     He has no cervical adenopathy.   Neurological: He is alert and oriented to person, place, and time. No cranial nerve deficit. He exhibits abnormal muscle tone.   Gait is normal.  Strength is 5/5 in the right lower extremity and 4/5 in the left lower extremity.  Right upper extremity strength is 5/5; left upper extremity strength is 3+/5.  Dysarthria is noted.   Skin: Skin is warm and dry. No rash noted.   Nursing note and vitals reviewed.      Results for orders placed or performed in visit on 07/17/17   CBC auto differential   Result Value Ref Range    WBC 6.60 3.90  - 12.70 K/uL    RBC 4.64 4.60 - 6.20 M/uL    Hemoglobin 12.5 (L) 14.0 - 18.0 g/dL    Hematocrit 39.4 (L) 40.0 - 54.0 %    MCV 85 82 - 98 fL    MCH 26.9 (L) 27.0 - 31.0 pg    MCHC 31.7 (L) 32.0 - 36.0 %    RDW 14.3 11.5 - 14.5 %    Platelets 237 150 - 350 K/uL    MPV 10.9 9.2 - 12.9 fL    Gran # 4.2 1.8 - 7.7 K/uL    Lymph # 1.4 1.0 - 4.8 K/uL    Mono # 0.8 0.3 - 1.0 K/uL    Eos # 0.3 0.0 - 0.5 K/uL    Baso # 0.02 0.00 - 0.20 K/uL    Gran% 63.0 38.0 - 73.0 %    Lymph% 20.6 18.0 - 48.0 %    Mono% 11.4 4.0 - 15.0 %    Eosinophil% 4.7 0.0 - 8.0 %    Basophil% 0.3 0.0 - 1.9 %    Differential Method Automated    Comprehensive metabolic panel   Result Value Ref Range    Sodium 139 136 - 145 mmol/L    Potassium 4.3 3.5 - 5.1 mmol/L    Chloride 103 95 - 110 mmol/L    CO2 28 23 - 29 mmol/L    Glucose 105 70 - 110 mg/dL    BUN, Bld 17 8 - 23 mg/dL    Creatinine 1.4 0.5 - 1.4 mg/dL    Calcium 9.8 8.7 - 10.5 mg/dL    Total Protein 8.4 6.0 - 8.4 g/dL    Albumin 3.8 3.5 - 5.2 g/dL    Total Bilirubin 0.3 0.1 - 1.0 mg/dL    Alkaline Phosphatase 69 55 - 135 U/L    AST 18 10 - 40 U/L    ALT 12 10 - 44 U/L    Anion Gap 8 8 - 16 mmol/L    eGFR if African American >60.0 >60 mL/min/1.73 m^2    eGFR if non  52.7 (A) >60 mL/min/1.73 m^2   Lipid panel   Result Value Ref Range    Cholesterol 99 (L) 120 - 199 mg/dL    Triglycerides 72 30 - 150 mg/dL    HDL 39 (L) 40 - 75 mg/dL    LDL Cholesterol 45.6 (L) 63.0 - 159.0 mg/dL    HDL/Chol Ratio 39.4 20.0 - 50.0 %    Total Cholesterol/HDL Ratio 2.5 2.0 - 5.0    Non-HDL Cholesterol 60 mg/dL       Assessment:       1. Essential hypertension    2. S/P stroke due to cerebrovascular disease    3. Left hemiparesis    4. Dysarthria    5. Jaw disease        Plan:       Alfonso was seen today for follow-up.  The dose of metoprolol will be decreased to 25 mg daily.  A prescription for pain medication for the patient's jaw pain will be given until he sees his oral surgeon again.  The patient is  to return to clinic in 3-4 months.    Diagnoses and all orders for this visit:    Essential hypertension    S/P stroke due to cerebrovascular disease    Left hemiparesis    Dysarthria    Jaw disease    Other orders  -     hydrocodone-acetaminophen 5-325mg (NORCO) 5-325 mg per tablet; Take 1 tablet by mouth every 8 (eight) hours as needed for Pain (severe pain).

## 2017-07-27 RX ORDER — PANTOPRAZOLE SODIUM 40 MG/1
TABLET, DELAYED RELEASE ORAL
Qty: 30 TABLET | Refills: 6 | Status: SHIPPED | OUTPATIENT
Start: 2017-07-27 | End: 2018-03-08 | Stop reason: SDUPTHER

## 2017-08-03 ENCOUNTER — HISTORICAL (OUTPATIENT)
Dept: ADMINISTRATIVE | Facility: HOSPITAL | Age: 62
End: 2017-08-03

## 2017-08-04 ENCOUNTER — HISTORICAL (OUTPATIENT)
Dept: ADMINISTRATIVE | Facility: HOSPITAL | Age: 62
End: 2017-08-04

## 2017-08-07 ENCOUNTER — HISTORICAL (OUTPATIENT)
Dept: ADMINISTRATIVE | Facility: HOSPITAL | Age: 62
End: 2017-08-07

## 2017-08-08 ENCOUNTER — HISTORICAL (OUTPATIENT)
Dept: ADMINISTRATIVE | Facility: HOSPITAL | Age: 62
End: 2017-08-08

## 2017-08-09 ENCOUNTER — HISTORICAL (OUTPATIENT)
Dept: ADMINISTRATIVE | Facility: HOSPITAL | Age: 62
End: 2017-08-09

## 2017-08-10 ENCOUNTER — HISTORICAL (OUTPATIENT)
Dept: ADMINISTRATIVE | Facility: HOSPITAL | Age: 62
End: 2017-08-10

## 2017-08-11 ENCOUNTER — HISTORICAL (OUTPATIENT)
Dept: ADMINISTRATIVE | Facility: HOSPITAL | Age: 62
End: 2017-08-11

## 2017-08-14 ENCOUNTER — HISTORICAL (OUTPATIENT)
Dept: ADMINISTRATIVE | Facility: HOSPITAL | Age: 62
End: 2017-08-14

## 2017-08-15 ENCOUNTER — HISTORICAL (OUTPATIENT)
Dept: ADMINISTRATIVE | Facility: HOSPITAL | Age: 62
End: 2017-08-15

## 2017-08-16 ENCOUNTER — HISTORICAL (OUTPATIENT)
Dept: ADMINISTRATIVE | Facility: HOSPITAL | Age: 62
End: 2017-08-16

## 2017-08-17 ENCOUNTER — HISTORICAL (OUTPATIENT)
Dept: ADMINISTRATIVE | Facility: HOSPITAL | Age: 62
End: 2017-08-17

## 2017-08-18 ENCOUNTER — HISTORICAL (OUTPATIENT)
Dept: ADMINISTRATIVE | Facility: HOSPITAL | Age: 62
End: 2017-08-18

## 2017-08-21 ENCOUNTER — HISTORICAL (OUTPATIENT)
Dept: ADMINISTRATIVE | Facility: HOSPITAL | Age: 62
End: 2017-08-21

## 2017-08-22 ENCOUNTER — HISTORICAL (OUTPATIENT)
Dept: ADMINISTRATIVE | Facility: HOSPITAL | Age: 62
End: 2017-08-22

## 2017-08-23 ENCOUNTER — HISTORICAL (OUTPATIENT)
Dept: ADMINISTRATIVE | Facility: HOSPITAL | Age: 62
End: 2017-08-23

## 2017-08-24 ENCOUNTER — HISTORICAL (OUTPATIENT)
Dept: ADMINISTRATIVE | Facility: HOSPITAL | Age: 62
End: 2017-08-24

## 2017-08-25 ENCOUNTER — HISTORICAL (OUTPATIENT)
Dept: ADMINISTRATIVE | Facility: HOSPITAL | Age: 62
End: 2017-08-25

## 2017-08-30 ENCOUNTER — HISTORICAL (OUTPATIENT)
Dept: ADMINISTRATIVE | Facility: HOSPITAL | Age: 62
End: 2017-08-30

## 2017-08-31 ENCOUNTER — HISTORICAL (OUTPATIENT)
Dept: ADMINISTRATIVE | Facility: HOSPITAL | Age: 62
End: 2017-08-31

## 2017-09-01 ENCOUNTER — HISTORICAL (OUTPATIENT)
Dept: ADMINISTRATIVE | Facility: HOSPITAL | Age: 62
End: 2017-09-01

## 2017-09-04 RX ORDER — FERROUS GLUCONATE 324(37.5)
TABLET ORAL
Qty: 30 TABLET | Refills: 5 | Status: SHIPPED | OUTPATIENT
Start: 2017-09-04 | End: 2017-11-10 | Stop reason: DRUGHIGH

## 2017-10-05 ENCOUNTER — HISTORICAL (OUTPATIENT)
Dept: ADMINISTRATIVE | Facility: HOSPITAL | Age: 62
End: 2017-10-05

## 2017-10-06 ENCOUNTER — HISTORICAL (OUTPATIENT)
Dept: ADMINISTRATIVE | Facility: HOSPITAL | Age: 62
End: 2017-10-06

## 2017-10-09 ENCOUNTER — HISTORICAL (OUTPATIENT)
Dept: ADMINISTRATIVE | Facility: HOSPITAL | Age: 62
End: 2017-10-09

## 2017-10-10 ENCOUNTER — HISTORICAL (OUTPATIENT)
Dept: ADMINISTRATIVE | Facility: HOSPITAL | Age: 62
End: 2017-10-10

## 2017-10-11 ENCOUNTER — HISTORICAL (OUTPATIENT)
Dept: ADMINISTRATIVE | Facility: HOSPITAL | Age: 62
End: 2017-10-11

## 2017-10-12 ENCOUNTER — HISTORICAL (OUTPATIENT)
Dept: ADMINISTRATIVE | Facility: HOSPITAL | Age: 62
End: 2017-10-12

## 2017-10-13 ENCOUNTER — HISTORICAL (OUTPATIENT)
Dept: ADMINISTRATIVE | Facility: HOSPITAL | Age: 62
End: 2017-10-13

## 2017-10-16 ENCOUNTER — HISTORICAL (OUTPATIENT)
Dept: ADMINISTRATIVE | Facility: HOSPITAL | Age: 62
End: 2017-10-16

## 2017-10-17 ENCOUNTER — HISTORICAL (OUTPATIENT)
Dept: ADMINISTRATIVE | Facility: HOSPITAL | Age: 62
End: 2017-10-17

## 2017-10-18 ENCOUNTER — HISTORICAL (OUTPATIENT)
Dept: ADMINISTRATIVE | Facility: HOSPITAL | Age: 62
End: 2017-10-18

## 2017-11-10 ENCOUNTER — OFFICE VISIT (OUTPATIENT)
Dept: INTERNAL MEDICINE | Facility: CLINIC | Age: 62
End: 2017-11-10
Payer: COMMERCIAL

## 2017-11-10 DIAGNOSIS — R47.1 DYSARTHRIA: Chronic | ICD-10-CM

## 2017-11-10 DIAGNOSIS — N18.30 CKD (CHRONIC KIDNEY DISEASE) STAGE 3, GFR 30-59 ML/MIN: Chronic | ICD-10-CM

## 2017-11-10 DIAGNOSIS — Z86.73 S/P STROKE DUE TO CEREBROVASCULAR DISEASE: Chronic | ICD-10-CM

## 2017-11-10 DIAGNOSIS — E78.5 DYSLIPIDEMIA: Chronic | ICD-10-CM

## 2017-11-10 DIAGNOSIS — R35.1 NOCTURIA: ICD-10-CM

## 2017-11-10 DIAGNOSIS — I10 ESSENTIAL HYPERTENSION: Primary | Chronic | ICD-10-CM

## 2017-11-10 PROCEDURE — 99214 OFFICE O/P EST MOD 30 MIN: CPT | Mod: 25,S$GLB,, | Performed by: INTERNAL MEDICINE

## 2017-11-10 PROCEDURE — 90686 IIV4 VACC NO PRSV 0.5 ML IM: CPT | Mod: S$GLB,,, | Performed by: INTERNAL MEDICINE

## 2017-11-10 PROCEDURE — 90471 IMMUNIZATION ADMIN: CPT | Mod: S$GLB,,, | Performed by: INTERNAL MEDICINE

## 2017-11-10 PROCEDURE — 99999 PR PBB SHADOW E&M-EST. PATIENT-LVL III: CPT | Mod: PBBFAC,,, | Performed by: INTERNAL MEDICINE

## 2017-11-10 RX ORDER — METOPROLOL TARTRATE 25 MG/1
25 TABLET, FILM COATED ORAL DAILY
Qty: 30 TABLET | Refills: 11 | Status: SHIPPED | OUTPATIENT
Start: 2017-11-10

## 2017-11-13 ENCOUNTER — TELEPHONE (OUTPATIENT)
Dept: INTERNAL MEDICINE | Facility: CLINIC | Age: 62
End: 2017-11-13

## 2017-11-13 NOTE — TELEPHONE ENCOUNTER
----- Message from Kriss Sheikh sent at 11/13/2017 10:49 AM CST -----  Contact: patient on cell [phone]  Pt saw  on Friday for a physical and has some questions for him. Pt asked for Aurea to call him back.

## 2017-11-19 VITALS
RESPIRATION RATE: 16 BRPM | HEIGHT: 72 IN | SYSTOLIC BLOOD PRESSURE: 112 MMHG | TEMPERATURE: 98 F | BODY MASS INDEX: 26.57 KG/M2 | HEART RATE: 56 BPM | DIASTOLIC BLOOD PRESSURE: 64 MMHG | WEIGHT: 196.19 LBS

## 2017-11-20 NOTE — PROGRESS NOTES
Subjective:       Patient ID: Alfonso Arias is a 62 y.o. male.    Chief Complaint: Hypertension    HPI   The patient presents for follow-up of medical conditions.  He has hypertension and has been tolerating his blood pressure medication well.  His blood pressures are usually 120-145 systolic.  He has not experienced any headaches or dizziness.    He is eating soft foods and smoothies in view of his swallowing disorder.    He recently completed hyperbaric treatments for jaw osteonecrosis in [place], [place].  He is due for follow-up with his oral surgeon later this month.  Occasional choking episodes noted while eating.    No chronic severe joint and back pain.  He has been going to the fitness center 3-4 times a week for exercise.    Review of Systems   Constitutional: Negative for activity change and unexpected weight change.   HENT: Positive for trouble swallowing. Negative for hearing loss and rhinorrhea.    Eyes: Negative for discharge and visual disturbance.   Respiratory: Negative for chest tightness and wheezing.    Cardiovascular: Negative for chest pain and palpitations.   Gastrointestinal: Positive for constipation. Negative for blood in stool, diarrhea and vomiting.   Endocrine: Negative for polydipsia and polyuria.   Genitourinary: Negative for difficulty urinating, hematuria and urgency.   Musculoskeletal: Positive for arthralgias. Negative for joint swelling and neck pain.   Neurological: Positive for speech difficulty and weakness. Negative for headaches.   Psychiatric/Behavioral: Negative for confusion and dysphoric mood.       Objective:      Physical Exam   Constitutional: He is oriented to person, place, and time. He appears well-developed and well-nourished. No distress.   HENT:   Head: Normocephalic and atraumatic.   There is some deviation of the lower teeth in the right jaw.   Eyes: Conjunctivae and EOM are normal. No scleral icterus.   Neck: Normal range of motion. Neck supple. No JVD  present. No thyromegaly present.   Decreased lateral rotation is noted on range of motion testing.   Cardiovascular: Normal rate, regular rhythm, normal heart sounds and intact distal pulses.  Exam reveals no gallop and no friction rub.    No murmur heard.  Pulmonary/Chest: Effort normal and breath sounds normal. No respiratory distress. He has no wheezes. He has no rales.   Abdominal: Soft. Bowel sounds are normal. He exhibits no mass. There is no tenderness.   Musculoskeletal: Normal range of motion. He exhibits no tenderness.   Lymphadenopathy:     He has no cervical adenopathy.   Neurological: He is alert and oriented to person, place, and time. Coordination abnormal.   Gait is normal.  Dysarthria is present.  Strength is 5/5 in the right upper extremity and 3+/5 in the left upper extremity.  Lower extremity strength is 5/5 in the right lower extremity and 4+/5 in the left lower extremity.   Skin: Skin is warm and dry. No rash noted.   Nursing note and vitals reviewed.      Assessment:       1. Essential hypertension    2. Dysarthria    3. S/P stroke due to cerebrovascular disease    4. Dyslipidemia    5. CKD (chronic kidney disease) stage 3, GFR 30-59 ml/min    6. Nocturia        Plan:       Alfonso was seen today for hypertension.  Therapy will be continued.  Influenza vaccine will be administered today.  Fasting blood tests will be obtained later this month.  A follow-up visit in 6 months will be obtained.    Diagnoses and all orders for this visit:    Essential hypertension  -     Comprehensive metabolic panel; Future  -     Lipid panel; Future  -     CBC auto differential; Future  -     TSH; Future  -     PSA, Screening; Future  -     Prostate Specific Antigen, Diagnostic; Future    Dysarthria  -     Comprehensive metabolic panel; Future  -     Lipid panel; Future  -     CBC auto differential; Future  -     TSH; Future  -     PSA, Screening; Future  -     Prostate Specific Antigen, Diagnostic; Future    S/P  stroke due to cerebrovascular disease  -     Comprehensive metabolic panel; Future  -     Lipid panel; Future  -     CBC auto differential; Future  -     TSH; Future  -     PSA, Screening; Future  -     Prostate Specific Antigen, Diagnostic; Future    Dyslipidemia  -     Comprehensive metabolic panel; Future  -     Lipid panel; Future  -     CBC auto differential; Future  -     TSH; Future  -     PSA, Screening; Future  -     Prostate Specific Antigen, Diagnostic; Future    CKD (chronic kidney disease) stage 3, GFR 30-59 ml/min  -     Comprehensive metabolic panel; Future  -     Lipid panel; Future  -     CBC auto differential; Future  -     TSH; Future  -     PSA, Screening; Future  -     Prostate Specific Antigen, Diagnostic; Future    Nocturia    Other orders  -     metoprolol tartrate (LOPRESSOR) 25 MG tablet; Take 1 tablet (25 mg total) by mouth once daily.  -     Influenza - Quadrivalent (3 years & older) (PF)

## 2017-11-28 ENCOUNTER — LAB VISIT (OUTPATIENT)
Dept: LAB | Facility: HOSPITAL | Age: 62
End: 2017-11-28
Attending: INTERNAL MEDICINE
Payer: COMMERCIAL

## 2017-11-28 DIAGNOSIS — N18.30 CKD (CHRONIC KIDNEY DISEASE) STAGE 3, GFR 30-59 ML/MIN: Chronic | ICD-10-CM

## 2017-11-28 DIAGNOSIS — Z86.73 S/P STROKE DUE TO CEREBROVASCULAR DISEASE: Chronic | ICD-10-CM

## 2017-11-28 DIAGNOSIS — E78.5 DYSLIPIDEMIA: Chronic | ICD-10-CM

## 2017-11-28 DIAGNOSIS — I10 ESSENTIAL HYPERTENSION: Chronic | ICD-10-CM

## 2017-11-28 DIAGNOSIS — R47.1 DYSARTHRIA: Chronic | ICD-10-CM

## 2017-11-28 LAB
ALBUMIN SERPL BCP-MCNC: 3.6 G/DL
ALP SERPL-CCNC: 76 U/L
ALT SERPL W/O P-5'-P-CCNC: 16 U/L
ANION GAP SERPL CALC-SCNC: 8 MMOL/L
AST SERPL-CCNC: 20 U/L
BASOPHILS # BLD AUTO: 0.02 K/UL
BASOPHILS NFR BLD: 0.2 %
BILIRUB SERPL-MCNC: 0.3 MG/DL
BUN SERPL-MCNC: 19 MG/DL
CALCIUM SERPL-MCNC: 10 MG/DL
CHLORIDE SERPL-SCNC: 106 MMOL/L
CHOLEST SERPL-MCNC: 96 MG/DL
CHOLEST/HDLC SERPL: 2.2 {RATIO}
CO2 SERPL-SCNC: 27 MMOL/L
COMPLEXED PSA SERPL-MCNC: 0.73 NG/ML
CREAT SERPL-MCNC: 1.3 MG/DL
DIFFERENTIAL METHOD: ABNORMAL
EOSINOPHIL # BLD AUTO: 0 K/UL
EOSINOPHIL NFR BLD: 0.1 %
ERYTHROCYTE [DISTWIDTH] IN BLOOD BY AUTOMATED COUNT: 14.6 %
EST. GFR  (AFRICAN AMERICAN): >60 ML/MIN/1.73 M^2
EST. GFR  (NON AFRICAN AMERICAN): 57.7 ML/MIN/1.73 M^2
GLUCOSE SERPL-MCNC: 101 MG/DL
HCT VFR BLD AUTO: 37.5 %
HDLC SERPL-MCNC: 44 MG/DL
HDLC SERPL: 45.8 %
HGB BLD-MCNC: 11.6 G/DL
IMM GRANULOCYTES # BLD AUTO: 0.02 K/UL
IMM GRANULOCYTES NFR BLD AUTO: 0.2 %
LDLC SERPL CALC-MCNC: 42.2 MG/DL
LYMPHOCYTES # BLD AUTO: 1.6 K/UL
LYMPHOCYTES NFR BLD: 15.8 %
MCH RBC QN AUTO: 26.5 PG
MCHC RBC AUTO-ENTMCNC: 30.9 G/DL
MCV RBC AUTO: 86 FL
MONOCYTES # BLD AUTO: 0.9 K/UL
MONOCYTES NFR BLD: 8.3 %
NEUTROPHILS # BLD AUTO: 7.8 K/UL
NEUTROPHILS NFR BLD: 75.4 %
NONHDLC SERPL-MCNC: 52 MG/DL
NRBC BLD-RTO: 0 /100 WBC
PLATELET # BLD AUTO: 248 K/UL
PMV BLD AUTO: 11.5 FL
POTASSIUM SERPL-SCNC: 4.5 MMOL/L
PROT SERPL-MCNC: 8.1 G/DL
RBC # BLD AUTO: 4.38 M/UL
SODIUM SERPL-SCNC: 141 MMOL/L
TRIGL SERPL-MCNC: 49 MG/DL
TSH SERPL DL<=0.005 MIU/L-ACNC: 1.2 UIU/ML
WBC # BLD AUTO: 10.36 K/UL

## 2017-11-28 PROCEDURE — 84443 ASSAY THYROID STIM HORMONE: CPT

## 2017-11-28 PROCEDURE — 80061 LIPID PANEL: CPT

## 2017-11-28 PROCEDURE — 84153 ASSAY OF PSA TOTAL: CPT

## 2017-11-28 PROCEDURE — 80053 COMPREHEN METABOLIC PANEL: CPT

## 2017-11-28 PROCEDURE — 36415 COLL VENOUS BLD VENIPUNCTURE: CPT | Mod: PO

## 2017-11-28 PROCEDURE — 85025 COMPLETE CBC W/AUTO DIFF WBC: CPT

## 2017-12-14 ENCOUNTER — TELEPHONE (OUTPATIENT)
Dept: INTERNAL MEDICINE | Facility: CLINIC | Age: 62
End: 2017-12-14

## 2017-12-14 NOTE — TELEPHONE ENCOUNTER
Spoke with wife , patient has a fever 102.8 it comes down after taking tylenol to only 100.2 and c/o cough,she was encouraged to bring him to the ER.

## 2017-12-14 NOTE — TELEPHONE ENCOUNTER
----- Message from Shreya Gómez sent at 12/14/2017 10:25 AM CST -----  Contact: Spouse , Julienne  [phone]  Patient wife ask that Aurea calls her and she will then explain what it is she needs. One is about his cough, she would say about the other.

## 2018-01-12 RX ORDER — FERROUS GLUCONATE 324(38)MG
1 TABLET ORAL DAILY
Refills: 5 | COMMUNITY
Start: 2017-11-06

## 2018-01-16 ENCOUNTER — OFFICE VISIT (OUTPATIENT)
Dept: INTERNAL MEDICINE | Facility: CLINIC | Age: 63
End: 2018-01-16
Payer: MEDICARE

## 2018-01-16 VITALS
WEIGHT: 199.75 LBS | BODY MASS INDEX: 27.06 KG/M2 | SYSTOLIC BLOOD PRESSURE: 128 MMHG | HEIGHT: 72 IN | HEART RATE: 58 BPM | TEMPERATURE: 98 F | DIASTOLIC BLOOD PRESSURE: 70 MMHG

## 2018-01-16 DIAGNOSIS — R47.1 DYSARTHRIA: Chronic | ICD-10-CM

## 2018-01-16 DIAGNOSIS — M87.9 OSTEONECROSIS OF JAW: ICD-10-CM

## 2018-01-16 DIAGNOSIS — I10 HYPERTENSION, ESSENTIAL: ICD-10-CM

## 2018-01-16 DIAGNOSIS — Z86.73 S/P STROKE DUE TO CEREBROVASCULAR DISEASE: Chronic | ICD-10-CM

## 2018-01-16 DIAGNOSIS — Z01.818 PREOP EXAM FOR INTERNAL MEDICINE: Primary | ICD-10-CM

## 2018-01-16 PROCEDURE — 99213 OFFICE O/P EST LOW 20 MIN: CPT | Mod: PBBFAC,25,PO | Performed by: INTERNAL MEDICINE

## 2018-01-16 PROCEDURE — 99214 OFFICE O/P EST MOD 30 MIN: CPT | Mod: S$PBB,,, | Performed by: INTERNAL MEDICINE

## 2018-01-16 PROCEDURE — 99999 PR PBB SHADOW E&M-EST. PATIENT-LVL III: CPT | Mod: PBBFAC,,, | Performed by: INTERNAL MEDICINE

## 2018-01-16 PROCEDURE — 93005 ELECTROCARDIOGRAM TRACING: CPT | Mod: PBBFAC,PO | Performed by: INTERNAL MEDICINE

## 2018-01-16 PROCEDURE — 93010 ELECTROCARDIOGRAM REPORT: CPT | Mod: ,,, | Performed by: INTERNAL MEDICINE

## 2018-01-16 RX ORDER — PNV NO.95/FERROUS FUM/FOLIC AC 28MG-0.8MG
1000 TABLET ORAL DAILY
COMMUNITY

## 2018-01-16 RX ORDER — HYDROCODONE BITARTRATE AND ACETAMINOPHEN 5; 325 MG/1; MG/1
1 TABLET ORAL EVERY 8 HOURS PRN
Qty: 60 TABLET | Refills: 0 | Status: SHIPPED | OUTPATIENT
Start: 2018-01-16

## 2018-01-16 RX ORDER — PENTOXIFYLLINE 400 MG/1
400 TABLET, EXTENDED RELEASE ORAL
COMMUNITY

## 2018-01-19 NOTE — PROGRESS NOTES
Subjective:       Patient ID: Alfonso Arias is a 62 y.o. male.    Chief Complaint: Pre-op Exam    HPI   The patient presents for preoperative medical clearance for oral and maxillofacial surgery.  The patient is being treated by Dr. MERISSA Gomez of the [facility].  Mandibular resection and free flap reconstruction using the fibula is planned for treatment of osteonecrosis of the jaw.  The patient is currently feeling well.  He reports that he did experience flu and pneumonia during December 2017.  Follow-up chest x-ray performed by his treating doctor at the time was clear of any lung infiltrates.  He denies experiencing any chest pain or shortness of breath at the present time.  He denies having any fever, chills, or night sweats.  His exercise tolerance remains good.  Previous surgeries have included tonsillectomy, sinus surgery, tongue biopsies.  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  He does not have diabetes mellitus, asthma, or known coronary artery disease.  He is a former smoker.  He does not use alcohol.  NO KNOWN DRUG ALLERGIES.    Review of Systems   Constitutional: Negative for activity change, appetite change, fatigue and unexpected weight change.   HENT: Positive for dental problem, drooling and trouble swallowing. Negative for sinus pressure and sore throat.    Eyes: Negative for visual disturbance.   Respiratory: Negative for cough, chest tightness, shortness of breath and wheezing.    Cardiovascular: Negative for chest pain, palpitations and leg swelling.   Gastrointestinal: Negative for abdominal pain, blood in stool, nausea and vomiting.   Genitourinary: Negative for dysuria, hematuria and urgency.   Musculoskeletal: Negative for arthralgias, back pain, gait problem, joint swelling, myalgias and neck stiffness.   Skin: Negative for  color change and rash.   Neurological: Positive for speech difficulty and weakness. Negative for dizziness, syncope, light-headedness, numbness and headaches.   Psychiatric/Behavioral: Negative for sleep disturbance.       Objective:      Physical Exam   Constitutional: He is oriented to person, place, and time. He appears well-developed and well-nourished. No distress.   HENT:   Head: Normocephalic and atraumatic.   There is localized tenderness and swelling involving the right mandible.   Eyes: Conjunctivae and EOM are normal. No scleral icterus.   Neck: Normal range of motion. Neck supple. No JVD present. No thyromegaly present.   Cardiovascular: Normal rate, regular rhythm, normal heart sounds and intact distal pulses.  Exam reveals no gallop and no friction rub.    No murmur heard.  Pulmonary/Chest: Effort normal and breath sounds normal. No respiratory distress. He has no wheezes. He has no rales.   Abdominal: Soft. Bowel sounds are normal. He exhibits no mass. There is no tenderness.   Musculoskeletal: Normal range of motion. He exhibits no tenderness.   Lymphadenopathy:     He has no cervical adenopathy.   Neurological: He is alert and oriented to person, place, and time.   Mild dysarthria is present.  Gait is grossly normal.  There is mild paresis of the left lower extremity on testing..   Skin: Skin is warm and dry. No rash noted.   Psychiatric: He has a normal mood and affect. His behavior is normal.   Nursing note and vitals reviewed.      EKG today shows sinus bradycardia with ventricular rate of 55.  First-degree AV block is present.  Left axis deviation.    Assessment:       1. Preop exam for internal medicine    2. Osteonecrosis of jaw    3. Hypertension, essential    4. S/P stroke due to cerebrovascular disease    5. Dysarthria        Plan:       Alfonso was seen today for pre-op exam.  Patient is medically stable for surgery as planned.  Updated laboratory studies will be obtained.  Current  medications will be continued.  A routine follow-up in 3 months is recommended.    Diagnoses and all orders for this visit:    Preop exam for internal medicine    Osteonecrosis of jaw    Hypertension, essential  -     EKG 12-lead    Other orders  -     hydrocodone-acetaminophen 5-325mg (NORCO) 5-325 mg per tablet; Take 1 tablet by mouth every 8 (eight) hours as needed for Pain (severe pain).

## 2018-02-02 RX ORDER — FERROUS GLUCONATE 324(38)MG
TABLET ORAL
Qty: 30 TABLET | Refills: 2 | Status: SHIPPED | OUTPATIENT
Start: 2018-02-02

## 2018-02-11 RX ORDER — ATORVASTATIN CALCIUM 40 MG/1
TABLET, FILM COATED ORAL
Qty: 30 TABLET | Refills: 6 | Status: SHIPPED | OUTPATIENT
Start: 2018-02-11

## 2018-02-27 ENCOUNTER — TELEPHONE (OUTPATIENT)
Dept: INTERNAL MEDICINE | Facility: CLINIC | Age: 63
End: 2018-02-27

## 2018-02-27 NOTE — TELEPHONE ENCOUNTER
----- Message from Vicky Canales sent at 2/26/2018  4:34 PM CST -----  Contact: Patricia @  of [facility] [phone]  Nurse is calling in regards to patient about to have a surgery on Wednesday and they need  a copy of the patient's EKG test results.  Please advise

## 2018-02-27 NOTE — TELEPHONE ENCOUNTER
----- Message from Padmjaa Ivey sent at 2/27/2018 10:02 AM CST -----  Contact: Brandi [phone]  [facility] Pre op department is calling to give fax [phone] . Please advise, Thanks

## 2018-03-08 RX ORDER — PANTOPRAZOLE SODIUM 40 MG/1
TABLET, DELAYED RELEASE ORAL
Qty: 30 TABLET | Refills: 6 | Status: SHIPPED | OUTPATIENT
Start: 2018-03-08

## 2018-04-06 ENCOUNTER — TELEPHONE (OUTPATIENT)
Dept: INTERNAL MEDICINE | Facility: CLINIC | Age: 63
End: 2018-04-06

## 2018-04-06 NOTE — TELEPHONE ENCOUNTER
----- Message from Sulma Lorenzo sent at 4/6/2018  9:43 AM CDT -----  Contact: Julienne Arias (Spouse)[phone]  Patient wife would like a call back refuse to give details     Please advise